# Patient Record
Sex: FEMALE | Race: WHITE | NOT HISPANIC OR LATINO | Employment: OTHER | URBAN - METROPOLITAN AREA
[De-identification: names, ages, dates, MRNs, and addresses within clinical notes are randomized per-mention and may not be internally consistent; named-entity substitution may affect disease eponyms.]

---

## 2019-12-19 PROBLEM — R40.4 ALTERED LEVEL OF CONSCIOUSNESS: Status: ACTIVE | Noted: 2019-12-19

## 2019-12-20 ENCOUNTER — HOSPITAL ENCOUNTER (OUTPATIENT)
Facility: MEDICAL CENTER | Age: 84
DRG: 026 | End: 2019-12-20
Admitting: HOSPITALIST
Payer: MEDICARE

## 2019-12-20 ENCOUNTER — HOSPITAL ENCOUNTER (INPATIENT)
Facility: MEDICAL CENTER | Age: 84
LOS: 8 days | DRG: 026 | End: 2019-12-28
Attending: HOSPITALIST | Admitting: HOSPITALIST
Payer: MEDICARE

## 2019-12-20 ENCOUNTER — HOSPITAL ENCOUNTER (OUTPATIENT)
Dept: RADIOLOGY | Facility: MEDICAL CENTER | Age: 84
End: 2019-12-20

## 2019-12-20 PROBLEM — I10 ESSENTIAL HYPERTENSION: Status: ACTIVE | Noted: 2019-12-20

## 2019-12-20 PROBLEM — R73.9 HYPERGLYCEMIA: Status: ACTIVE | Noted: 2019-12-20

## 2019-12-20 PROBLEM — G93.89 BRAIN MASS: Status: ACTIVE | Noted: 2019-12-20

## 2019-12-20 PROBLEM — G93.40 ACUTE ENCEPHALOPATHY: Status: ACTIVE | Noted: 2019-12-19

## 2019-12-20 PROCEDURE — 700105 HCHG RX REV CODE 258: Performed by: INTERNAL MEDICINE

## 2019-12-20 PROCEDURE — 94760 N-INVAS EAR/PLS OXIMETRY 1: CPT

## 2019-12-20 PROCEDURE — 770001 HCHG ROOM/CARE - MED/SURG/GYN PRIV*

## 2019-12-20 PROCEDURE — 99223 1ST HOSP IP/OBS HIGH 75: CPT | Performed by: INTERNAL MEDICINE

## 2019-12-20 PROCEDURE — 700111 HCHG RX REV CODE 636 W/ 250 OVERRIDE (IP): Performed by: INTERNAL MEDICINE

## 2019-12-20 RX ORDER — NALOXONE HYDROCHLORIDE 0.4 MG/ML
0.1 INJECTION, SOLUTION INTRAMUSCULAR; INTRAVENOUS; SUBCUTANEOUS
Status: DISCONTINUED | OUTPATIENT
Start: 2019-12-20 | End: 2019-12-28 | Stop reason: HOSPADM

## 2019-12-20 RX ORDER — BISACODYL 10 MG
10 SUPPOSITORY, RECTAL RECTAL
Status: DISCONTINUED | OUTPATIENT
Start: 2019-12-20 | End: 2019-12-26

## 2019-12-20 RX ORDER — PROMETHAZINE HYDROCHLORIDE 25 MG/ML
12.5 INJECTION, SOLUTION INTRAMUSCULAR; INTRAVENOUS EVERY 4 HOURS PRN
Status: DISCONTINUED | OUTPATIENT
Start: 2019-12-20 | End: 2019-12-28 | Stop reason: HOSPADM

## 2019-12-20 RX ORDER — ATORVASTATIN CALCIUM 40 MG/1
40 TABLET, FILM COATED ORAL EVERY EVENING
Status: DISCONTINUED | OUTPATIENT
Start: 2019-12-20 | End: 2019-12-28 | Stop reason: HOSPADM

## 2019-12-20 RX ORDER — DEXAMETHASONE SODIUM PHOSPHATE 4 MG/ML
4 INJECTION, SOLUTION INTRA-ARTICULAR; INTRALESIONAL; INTRAMUSCULAR; INTRAVENOUS; SOFT TISSUE EVERY 6 HOURS
Status: DISCONTINUED | OUTPATIENT
Start: 2019-12-20 | End: 2019-12-28 | Stop reason: HOSPADM

## 2019-12-20 RX ORDER — ONDANSETRON 2 MG/ML
4 INJECTION INTRAMUSCULAR; INTRAVENOUS EVERY 4 HOURS PRN
Status: DISCONTINUED | OUTPATIENT
Start: 2019-12-20 | End: 2019-12-28 | Stop reason: HOSPADM

## 2019-12-20 RX ORDER — SODIUM CHLORIDE 9 MG/ML
INJECTION, SOLUTION INTRAVENOUS CONTINUOUS
Status: DISCONTINUED | OUTPATIENT
Start: 2019-12-20 | End: 2019-12-28 | Stop reason: HOSPADM

## 2019-12-20 RX ORDER — DEXTROSE MONOHYDRATE 25 G/50ML
50 INJECTION, SOLUTION INTRAVENOUS PRN
Status: DISCONTINUED | OUTPATIENT
Start: 2019-12-20 | End: 2019-12-26

## 2019-12-20 RX ORDER — DOCUSATE SODIUM 100 MG/1
100 CAPSULE, LIQUID FILLED ORAL 2 TIMES DAILY PRN
Status: DISCONTINUED | OUTPATIENT
Start: 2019-12-20 | End: 2019-12-28 | Stop reason: HOSPADM

## 2019-12-20 RX ORDER — ACETAMINOPHEN 325 MG/1
650 TABLET ORAL EVERY 6 HOURS PRN
Status: DISCONTINUED | OUTPATIENT
Start: 2019-12-20 | End: 2019-12-28 | Stop reason: HOSPADM

## 2019-12-20 RX ORDER — LACTULOSE 20 G/30ML
30 SOLUTION ORAL
Status: DISCONTINUED | OUTPATIENT
Start: 2019-12-20 | End: 2019-12-28 | Stop reason: HOSPADM

## 2019-12-20 RX ORDER — ENALAPRILAT 1.25 MG/ML
1.25 INJECTION INTRAVENOUS EVERY 6 HOURS PRN
Status: DISCONTINUED | OUTPATIENT
Start: 2019-12-20 | End: 2019-12-28 | Stop reason: HOSPADM

## 2019-12-20 RX ORDER — ONDANSETRON 4 MG/1
4 TABLET, ORALLY DISINTEGRATING ORAL EVERY 4 HOURS PRN
Status: DISCONTINUED | OUTPATIENT
Start: 2019-12-20 | End: 2019-12-28 | Stop reason: HOSPADM

## 2019-12-20 RX ORDER — TEMAZEPAM 7.5 MG/1
7.5 CAPSULE ORAL
Status: DISCONTINUED | OUTPATIENT
Start: 2019-12-20 | End: 2019-12-28 | Stop reason: HOSPADM

## 2019-12-20 RX ORDER — OMEPRAZOLE 20 MG/1
20 CAPSULE, DELAYED RELEASE ORAL DAILY
Status: DISCONTINUED | OUTPATIENT
Start: 2019-12-21 | End: 2019-12-28 | Stop reason: HOSPADM

## 2019-12-20 RX ORDER — MAGNESIUM HYDROXIDE/ALUMINUM HYDROXICE/SIMETHICONE 120; 1200; 1200 MG/30ML; MG/30ML; MG/30ML
30 SUSPENSION ORAL EVERY 4 HOURS PRN
Status: DISCONTINUED | OUTPATIENT
Start: 2019-12-20 | End: 2019-12-20

## 2019-12-20 RX ORDER — HYDRALAZINE HYDROCHLORIDE 20 MG/ML
10 INJECTION INTRAMUSCULAR; INTRAVENOUS EVERY 4 HOURS PRN
Status: DISCONTINUED | OUTPATIENT
Start: 2019-12-20 | End: 2019-12-28 | Stop reason: HOSPADM

## 2019-12-20 RX ADMIN — DEXAMETHASONE SODIUM PHOSPHATE 4 MG: 4 INJECTION, SOLUTION INTRA-ARTICULAR; INTRALESIONAL; INTRAMUSCULAR; INTRAVENOUS; SOFT TISSUE at 21:30

## 2019-12-20 RX ADMIN — SODIUM CHLORIDE: 9 INJECTION, SOLUTION INTRAVENOUS at 21:30

## 2019-12-20 ASSESSMENT — COPD QUESTIONNAIRES
COPD SCREENING SCORE: 2
DO YOU EVER COUGH UP ANY MUCUS OR PHLEGM?: NO/ONLY WITH OCCASIONAL COLDS OR INFECTIONS
HAVE YOU SMOKED AT LEAST 100 CIGARETTES IN YOUR ENTIRE LIFE: NO/DON'T KNOW
DURING THE PAST 4 WEEKS HOW MUCH DID YOU FEEL SHORT OF BREATH: NONE/LITTLE OF THE TIME

## 2019-12-20 ASSESSMENT — LIFESTYLE VARIABLES: EVER_SMOKED: NEVER

## 2019-12-20 NOTE — PROGRESS NOTES
86yo presenting with ataxia and altered mentation.  New 2cm mass causing obstructive hydrocephalus.  Started on decadron.  Dr Fernandez of Neuro Surgery consulted and aware pt is coming.

## 2019-12-20 NOTE — PROGRESS NOTES
Transfer Center:    Received Med/Sx Inpatient to Direct Admit transfer request from [facility] [place] @ [phone]  Sending Physician:  Dr. Sergey Marinelli   Specialist consulted:  Dr. Mario Newton  Diagnosis:  New brain mass   Patient accepted by:  Dr. Brian Gardner     Patient coming via:  [facility]  ETA:  [phone]   Nursing to notify Direct Admit On-Call hospitalist when patient arrives     Plan:  Pending patient's arrival

## 2019-12-21 ENCOUNTER — APPOINTMENT (OUTPATIENT)
Dept: RADIOLOGY | Facility: MEDICAL CENTER | Age: 84
DRG: 026 | End: 2019-12-21
Attending: NEUROLOGICAL SURGERY
Payer: MEDICARE

## 2019-12-21 PROCEDURE — 700102 HCHG RX REV CODE 250 W/ 637 OVERRIDE(OP): Performed by: INTERNAL MEDICINE

## 2019-12-21 PROCEDURE — 74177 CT ABD & PELVIS W/CONTRAST: CPT

## 2019-12-21 PROCEDURE — A9270 NON-COVERED ITEM OR SERVICE: HCPCS | Performed by: INTERNAL MEDICINE

## 2019-12-21 PROCEDURE — 700117 HCHG RX CONTRAST REV CODE 255: Performed by: NEUROLOGICAL SURGERY

## 2019-12-21 PROCEDURE — 770001 HCHG ROOM/CARE - MED/SURG/GYN PRIV*

## 2019-12-21 PROCEDURE — 700105 HCHG RX REV CODE 258: Performed by: NEUROLOGICAL SURGERY

## 2019-12-21 PROCEDURE — 700105 HCHG RX REV CODE 258: Performed by: INTERNAL MEDICINE

## 2019-12-21 PROCEDURE — 700111 HCHG RX REV CODE 636 W/ 250 OVERRIDE (IP): Performed by: NEUROLOGICAL SURGERY

## 2019-12-21 PROCEDURE — 99232 SBSQ HOSP IP/OBS MODERATE 35: CPT | Performed by: INTERNAL MEDICINE

## 2019-12-21 PROCEDURE — 700111 HCHG RX REV CODE 636 W/ 250 OVERRIDE (IP): Performed by: INTERNAL MEDICINE

## 2019-12-21 RX ORDER — CEPHALEXIN 500 MG/1
500 CAPSULE ORAL EVERY 6 HOURS
Status: COMPLETED | OUTPATIENT
Start: 2019-12-21 | End: 2019-12-24

## 2019-12-21 RX ADMIN — DEXAMETHASONE SODIUM PHOSPHATE 4 MG: 4 INJECTION, SOLUTION INTRA-ARTICULAR; INTRALESIONAL; INTRAMUSCULAR; INTRAVENOUS; SOFT TISSUE at 16:26

## 2019-12-21 RX ADMIN — ATORVASTATIN CALCIUM 40 MG: 40 TABLET, FILM COATED ORAL at 18:23

## 2019-12-21 RX ADMIN — SODIUM CHLORIDE 500 MG: 9 INJECTION, SOLUTION INTRAVENOUS at 11:01

## 2019-12-21 RX ADMIN — SODIUM CHLORIDE: 9 INJECTION, SOLUTION INTRAVENOUS at 18:23

## 2019-12-21 RX ADMIN — DEXAMETHASONE SODIUM PHOSPHATE 4 MG: 4 INJECTION, SOLUTION INTRA-ARTICULAR; INTRALESIONAL; INTRAMUSCULAR; INTRAVENOUS; SOFT TISSUE at 04:58

## 2019-12-21 RX ADMIN — IOHEXOL 100 ML: 350 INJECTION, SOLUTION INTRAVENOUS at 12:38

## 2019-12-21 RX ADMIN — DEXAMETHASONE SODIUM PHOSPHATE 4 MG: 4 INJECTION, SOLUTION INTRA-ARTICULAR; INTRALESIONAL; INTRAMUSCULAR; INTRAVENOUS; SOFT TISSUE at 11:00

## 2019-12-21 RX ADMIN — DEXAMETHASONE SODIUM PHOSPHATE 4 MG: 4 INJECTION, SOLUTION INTRA-ARTICULAR; INTRALESIONAL; INTRAMUSCULAR; INTRAVENOUS; SOFT TISSUE at 20:11

## 2019-12-21 RX ADMIN — CEPHALEXIN 500 MG: 500 CAPSULE ORAL at 16:26

## 2019-12-21 RX ADMIN — SODIUM CHLORIDE: 9 INJECTION, SOLUTION INTRAVENOUS at 07:27

## 2019-12-21 RX ADMIN — CEPHALEXIN 500 MG: 500 CAPSULE ORAL at 20:11

## 2019-12-21 ASSESSMENT — ENCOUNTER SYMPTOMS
GASTROINTESTINAL NEGATIVE: 1
RESPIRATORY NEGATIVE: 1
CONSTITUTIONAL NEGATIVE: 1
CARDIOVASCULAR NEGATIVE: 1
SPEECH CHANGE: 1
DIZZINESS: 1
TREMORS: 1
PSYCHIATRIC NEGATIVE: 1

## 2019-12-21 ASSESSMENT — LIFESTYLE VARIABLES
ALCOHOL_USE: NO
ON A TYPICAL DAY WHEN YOU DRINK ALCOHOL HOW MANY DRINKS DO YOU HAVE: 0
EVER_SMOKED: NEVER
EVER HAD A DRINK FIRST THING IN THE MORNING TO STEADY YOUR NERVES TO GET RID OF A HANGOVER: NO
CONSUMPTION TOTAL: NEGATIVE
TOTAL SCORE: 0
HAVE YOU EVER FELT YOU SHOULD CUT DOWN ON YOUR DRINKING: NO
HOW MANY TIMES IN THE PAST YEAR HAVE YOU HAD 5 OR MORE DRINKS IN A DAY: 0
AVERAGE NUMBER OF DAYS PER WEEK YOU HAVE A DRINK CONTAINING ALCOHOL: 0
TOTAL SCORE: 0
TOTAL SCORE: 0
HAVE PEOPLE ANNOYED YOU BY CRITICIZING YOUR DRINKING: NO
EVER FELT BAD OR GUILTY ABOUT YOUR DRINKING: NO

## 2019-12-21 ASSESSMENT — COGNITIVE AND FUNCTIONAL STATUS - GENERAL
MOBILITY SCORE: 24
SUGGESTED CMS G CODE MODIFIER MOBILITY: CH
SUGGESTED CMS G CODE MODIFIER DAILY ACTIVITY: CH
DAILY ACTIVITIY SCORE: 24

## 2019-12-21 ASSESSMENT — PATIENT HEALTH QUESTIONNAIRE - PHQ9
1. LITTLE INTEREST OR PLEASURE IN DOING THINGS: NOT AT ALL
2. FEELING DOWN, DEPRESSED, IRRITABLE, OR HOPELESS: NOT AT ALL
SUM OF ALL RESPONSES TO PHQ9 QUESTIONS 1 AND 2: 0

## 2019-12-21 NOTE — CONSULTS
Neurosurgery Consult Note    Patient: Vera Garza    MRN: [id number]    Date of Consultation: 12/21/2019    Reason for Consultation: Left-sided brain mass    Referring Physician: Emergency department    History of Present Illness:  85-year-old female presented outside facility with altered mental status on 12/20/2019.  Patient cannot recollect why she went to the hospital.  Per chart review patient was accompanied by her daughter to the emergency room.  Patient does state that she lives alone usually.  She does have a daughter that helps her.  At this time the patient denies any headaches no weakness no numbness no tingling in any of her extremities.  No change in vision no double vision no blurry vision.  Patient was transferred to [facility] due to evidence of a left-sided temporal region mass.    Review of Systems:  Constitutional: Denies fevers, chills, night sweats, or weight changes  Eyes: Denies changes in vision, or eye pain  Ears/Nose/Throat/Mouth: Denies nasal congestion or sore throat   Cardiovascular: Denies chest pain or palpitations   Respiratory: Denies shortness of breath, or cough  Gastrointestinal/Hepatic: Denies abdominal pain, nausea, vomiting, diarrhea, or constipation  Genitourinary: Denies dysuria, frequency, or incontinence  Musculoskeletal/Rheum: Denies joint pain or swelling   Skin: Denies rash  Neurological: Denies headache, does have some confusion, memory loss, no focal weakness, or parasthesias  Psychiatric: Denies mood disorder   Endocrine: Denies hx of diabetes or thyroid dysfunction  Heme/Oncology/Lymph Nodes: Denies enlarged lymph nodes, bruising, or known bleeding disorder  Allergic/Immunologic: Denies hx of autoimmune disorder      Medications:  Current Facility-Administered Medications   Medication Dose Route Frequency Provider Last Rate Last Dose   • omeprazole (PRILOSEC) capsule 20 mg  20 mg Oral DAILY Fracisco Ferrer M.D.   Stopped at 12/21/19 0600   • atorvastatin (LIPITOR)  tablet 40 mg  40 mg Oral Q EVENING Fracisco Ferrer M.D.   Stopped at 12/20/19 2130   • dexamethasone (DECADRON) injection 4 mg  4 mg Intravenous Q6HRS Fracisco Ferrer M.D.   4 mg at 12/21/19 0458   • NS infusion   Intravenous Continuous Fracisco Ferrer M.D. 100 mL/hr at 12/20/19 2130     • acetaminophen (TYLENOL) tablet 650 mg  650 mg Oral Q6HRS PRN Fracisco Ferrer M.D.       • bisacodyl (DULCOLAX) suppository 10 mg  10 mg Rectal Q24HRS PRN Fracisco Ferrer M.D.       • dextrose 50% (D50W) injection 50 mL  50 mL Intravenous PRN Fracisco Ferrer M.D.       • docusate sodium (COLACE) capsule 100 mg  100 mg Oral BID PRN Fracisco Ferrer M.D.       • enalaprilat (VASOTEC) injection 1.25 mg  1.25 mg Intravenous Q6HRS PRN Fracisco Ferrer M.D.       • hydrALAZINE (APRESOLINE) injection 10 mg  10 mg Intravenous Q4HRS PRN Fracisco Ferrer M.D.       • lactulose 20 GM/30ML solution 30 g  30 g Oral Q24HRS PRN Fracisco Ferrer M.D.       • magnesium hydroxide (MILK OF MAGNESIA) suspension 30 mL  30 mL Oral QDAY PRN Fracisco Ferrer M.D.       • naloxone (NARCAN) injection 0.1 mg  0.1 mg Intravenous Q5 MIN PRN Fracisco Ferrer M.D.       • ondansetron (ZOFRAN ODT) dispertab 4 mg  4 mg Oral Q4HRS PRN Fracisco Ferrer M.D.       • ondansetron (ZOFRAN) syringe/vial injection 4 mg  4 mg Intravenous Q4HRS PRN Fracisco Ferrer M.D.       • promethazine (PHENERGAN) injection 12.5 mg  12.5 mg Intramuscular Q4HRS PRN Fracisco Ferrer M.D.       • Respiratory Care per Protocol   Nebulization Continuous RT Fracisco Ferrer M.D.       • temazepam (RESTORIL) 7.5 mg  7.5 mg Oral QHS PRN Fracisco Ferrer M.D.           Allergies:  Allergies   Allergen Reactions   • Pcn [Penicillins]        Past Medical History:  Past Medical History:   Diagnosis Date   • Colitis    • Hypertension        Past Surgical History:  No past surgical history on file.    Family History:  No family history on file.    Social History:  Social History     Socioeconomic History   • Marital status:      Spouse name: Not on file   • Number of  children: Not on file   • Years of education: Not on file   • Highest education level: Not on file   Occupational History   • Not on file   Social Needs   • Financial resource strain: Not on file   • Food insecurity:     Worry: Not on file     Inability: Not on file   • Transportation needs:     Medical: Not on file     Non-medical: Not on file   Tobacco Use   • Smoking status: Unknown If Ever Smoked   Substance and Sexual Activity   • Alcohol use: No   • Drug use: No   • Sexual activity: Not on file   Lifestyle   • Physical activity:     Days per week: Not on file     Minutes per session: Not on file   • Stress: Not on file   Relationships   • Social connections:     Talks on phone: Not on file     Gets together: Not on file     Attends Hinduism service: Not on file     Active member of club or organization: Not on file     Attends meetings of clubs or organizations: Not on file     Relationship status: Not on file   • Intimate partner violence:     Fear of current or ex partner: Not on file     Emotionally abused: Not on file     Physically abused: Not on file     Forced sexual activity: Not on file   Other Topics Concern   • Not on file   Social History Narrative   • Not on file       Physical Examination:  Awake conversant, appropriate and answers, AAO x2 not to place  Follows commands in all 4 extremities  Antigravity strength in all 4 extremities  Sensory light touch is intact in all 4 extremities  PERRLA BL, EOMI    Labs:  Recent Labs     12/19/19  1840 12/20/19  0645   WBC 7.9 10.6   RBC 4.37 3.97*   HEMOGLOBIN 14.1 12.6*   HEMATOCRIT 41.4 36.9*   MCV 94.7 92.9   MCH 32.3* 31.7*   MCHC 34.1 34.1   RDW 11.9 11.7   PLATELETCT 274 230   MPV 8.6 8.3     Recent Labs     12/19/19  1840 12/20/19  0645   SODIUM 138 136   POTASSIUM 3.7 3.6   CHLORIDE 101 102   CO2 25 21   GLUCOSE 199* 124*   BUN 16 11   CREATININE 0.9 0.6   CALCIUM 9.1 8.5               Imaging: MRI of the brain with and without contrast  A  heterogeneously enhancing mass measuring 17 x 14 x 19 mm is noted situated within the choroid plexus of the left lateral ventricle, within the collateral trigone. There is a mild amount of mass effect upon adjacent brain structures, but no evidence of   current ventricular obstruction.  There is enlargement of the coronoid plexus within the temporal horn, possibly related to congestion.     Mild White matter degenerative changes are noted involving the cerebral hemispheres.    Assessment and Plan:  Left-sided temporal region mass    A lot absolutely convinced that the mass is coming from cord plexus although that would be in the differential diagnosis.  At this time work-up must be performed to rule out metastatic disease.  Chest abdomen pelvis CT scan is pending.  Further recommendations will follow post CT scan.  If there is a lesion that looks cancerous in nature on the chest and pelvis I would recommend biopsying that lesion and treating the brain lesion as a metastatic manifestation.  If the chest abdomen pelvis is negative then we will consider biopsying the lesion for further assessment and evaluation from pathology.  I do not believe the mass is completely resectable as it is in the deep portion of the left-sided temporal lobe.  Considering patient's age and comorbidities aggressive resection may not be the best option.  Further recommendations are to follow post imaging studies.  No emergent neurosurgical intervention at this time.  Continue Decadron and I will add Keppra 500 mg twice daily.  Patient may have originally had a seizure since the mass is in a seizure prone area namely the left temporal lobe.      Mario Newton D.O.

## 2019-12-21 NOTE — ASSESSMENT & PLAN NOTE
A heterogeneously enhancing mass measuring 17 x 14 x 19 mm is noted situated within the choroid plexus of the left lateral ventricle, within the collateral trigone. There is a mild amount of mass effect upon adjacent brain structures, but no evidence of   current ventricular obstruction.  There is enlargement of the coronoid plexus within the temporal horn, possibly related to congestion.    Pt now on decadron, keppra  NS planning biopsy   CT body negative  MRI pending today

## 2019-12-21 NOTE — H&P
Hospital Medicine History & Physical Note    Date of Service  12/20/2019    Primary Care Physician  Philip Hansen D.O.    Consultants  Dr. Fernandez    Code Status  full    Chief Complaint  AMS    History of Presenting Illness  85 y.o. female with past medical history of essential hypertension who presented 12/20/2019 with altered mental status.  She initially presented to outlying facility because surgery.  According to the patient she has been confused for the past 2 days.  The patient is accompanied by the daughter by the bedside.  The patient lives alone at home.  Currently according to the daughter she is still a little bit confused.  She stated that she has been having declining in her memory over the past few months.  The patient denies any headache, any weakness numbness over her extremities.  But she does mention that she noticed some slurred speech 2 days ago.  At outside facility she has   CT scan of the head done and found to have  1.5 cm mass lesion associated with the left choroidal plexus in the atria of the left lateral ventricle. This is of high attenuation. Differential diagnostic possibilities need to include a choroidal plexus papilloma as well as intra-ventricular meningioma.  MRI 17 x 14 x 19 mm is noted situated within the choroid plexus of the left lateral ventricle, within the collateral trigone. There is a mild amount of mass effect upon adjacent brain structures, but no evidence of   current ventricular obstruction.   NS was consulted before transfer.  Review of Systems  Review of Systems   Unable to perform ROS: Mental acuity       Past Medical History   has a past medical history of Colitis and Hypertension. She also has no past medical history of Diabetes.    Surgical History  Reviewed and no pertinent PSH     Family History   Reviewed and no pertinent     Social History   reports that she does not drink alcohol or use drugs.    Allergies  Allergies   Allergen Reactions   • Pcn  [Penicillins]        Medications  Prior to Admission Medications   Prescriptions Last Dose Informant Patient Reported? Taking?   Nebivolol HCl (BYSTOLIC) 5 MG TABS   Yes No   Sig: Take 5 mg by mouth.   cephALEXin (KEFLEX) 500 MG Cap   Yes No   Sig: Take 500 mg by mouth 3 times a day. Indications: Infection of the Skin and/or Skin Structures   omeprazole (PRILOSEC) 20 MG CPDR   Yes No   Sig: Take 20 mg by mouth every day.      Facility-Administered Medications: None       Physical Exam  Temp:  [37.2 °C (98.9 °F)] 37.2 °C (98.9 °F)  Pulse:  [87] 87  Resp:  [16] 16  BP: (149)/(69) 149/69  SpO2:  [91 %] 91 %    Physical Exam  Vitals signs reviewed.   Constitutional:       General: She is not in acute distress.     Appearance: Normal appearance.      Comments: confused   HENT:      Head: Normocephalic and atraumatic.      Nose: No congestion or rhinorrhea.   Eyes:      Extraocular Movements: Extraocular movements intact.      Pupils: Pupils are equal, round, and reactive to light.   Neck:      Musculoskeletal: Normal range of motion and neck supple.   Cardiovascular:      Rate and Rhythm: Normal rate and regular rhythm.      Pulses: Normal pulses.   Pulmonary:      Effort: Pulmonary effort is normal. No respiratory distress.      Breath sounds: Normal breath sounds.   Abdominal:      General: Bowel sounds are normal. There is no distension.      Palpations: Abdomen is soft.      Tenderness: There is no tenderness.   Musculoskeletal:         General: No swelling or tenderness.   Skin:     General: Skin is warm.      Findings: No erythema.   Neurological:      General: No focal deficit present.      Mental Status: She is alert.         Laboratory:  Recent Labs     12/19/19 1840 12/20/19  0645   WBC 7.9 10.6   RBC 4.37 3.97*   HEMOGLOBIN 14.1 12.6*   HEMATOCRIT 41.4 36.9*   MCV 94.7 92.9   MCH 32.3* 31.7*   MCHC 34.1 34.1   RDW 11.9 11.7   PLATELETCT 274 230   MPV 8.6 8.3     Recent Labs     12/19/19 1840 12/20/19  0645    SODIUM 138 136   POTASSIUM 3.7 3.6   CHLORIDE 101 102   CO2 25 21   GLUCOSE 199* 124*   BUN 16 11   CREATININE 0.9 0.6   CALCIUM 9.1 8.5     Recent Labs     12/19/19  1840 12/20/19  0645   ALTSGPT 28  --    ASTSGOT 22  --    ALKPHOSPHAT 88  --    TBILIRUBIN 0.4  --    LIPASE 77  --    GLUCOSE 199* 124*         No results for input(s): NTPROBNP in the last 72 hours.  Recent Labs     12/20/19  0645   TRIGLYCERIDE 44   HDL 88.0*   *     No results for input(s): TROPONINT in the last 72 hours.    Urinalysis:    Recent Labs     12/19/19  1900   SPECGRAVITY >=1.030*   GLUCOSEUR NEGATIVE   KETONES TRACE*   NITRITE NEGATIVE   LEUKESTERAS NEGATIVE   WBCURINE Rare   RBCURINE 0-2   BACTERIA Rare*   EPITHELCELL Rare*        Imaging:  CT-CHEST,ABDOMEN,PELVIS WITH    (Results Pending)         Assessment/Plan:  I anticipate this patient will require at least two midnights for appropriate medical management, necessitating inpatient admission.    Hyperglycemia- (present on admission)  Assessment & Plan  No history of DM    Essential hypertension- (present on admission)  Assessment & Plan  Holding bysystolic    Brain mass- (present on admission)  Assessment & Plan  CT head showed;  1.5 cm mass lesion associated with the left choroidal plexus in the atria of the left lateral ventricle. This is of high attenuation. Differential diagnostic possibilities need to include a choroidal plexus papilloma as well as intra-ventricular meningioma.   MRI brain 17 x 14 x 19 mm is noted situated within the choroid plexus of the left lateral ventricle, within the collateral trigone. There is a mild amount of mass effect upon adjacent brain structures, but no evidence of   current ventricular obstruction.  Dr. Newton consulted  CT chest/abd/pelvic ordered by NS  Monitor for possible seizure or new neurologic deficits  Started on decadron IV  NPO at midnight    Acute encephalopathy- (present on admission)  Assessment & Plan  Likely related  to brain mass  Avoid narcotics        VTE prophylaxis: scds

## 2019-12-21 NOTE — ASSESSMENT & PLAN NOTE
12/28 - discharged on 10mg lisinopril.  Intermittently well controlled on this and intermittently not controlled.  Large swings give concern for repeatability of the measure of her BP vs. Physiologic disturbances from the brain mass.  12/26-12/27 - stable  12/25 - continues to be poorly controlled, patient states not on any blood pressure medication prior to hospitalization.  Starting lisinopril 10mg today.  12/24 - poorly controlled in the mid 140s up to 160s while inpatient.  Permissive hypertension until after biopsy.   12/23 - stable  - Previous Plans by Other Providers -  Holding bystolic   98.4

## 2019-12-21 NOTE — ASSESSMENT & PLAN NOTE
12/28 - Patient medically stable for discharge.  Discharging on 4mg decadron q6h until cleared by NS and keppra 500mg bid  12/27 - Biopsy completed, patient will have follow-up in the OP in two weeks for results, likely discharge tomorrow.  Mentation actually improved quite a bit, much less visible perplexed look and more appropriate conversation.  12/26 - Patient seems to have flatlined in her improvement.  No changes at all now.  Biopsy today.  12/25 - patient ambulating halls, daughter communicates improvement in cognition.  Biopsy scheduled for tomorrow @1300  12/24 - Continued improvement today.  Awaiting biopsy, continue steroids.  12/23 - Biopsy has been rescheduled to Thursday.  Patient and patient's family note remarkable improvement since she was admitted.  Continue Steroids.  - Previous Plans by Other Providers -  CT head showed;  1.5 cm mass lesion associated with the left choroidal plexus in the atria of the left lateral ventricle. This is of high attenuation. Differential diagnostic possibilities need to include a choroidal plexus papilloma as well as intra-ventricular meningioma.   MRI brain 17 x 14 x 19 mm is noted situated within the choroid plexus of the left lateral ventricle, within the collateral trigone. There is a mild amount of mass effect upon adjacent brain structures, but no evidence of   current ventricular obstruction.  Dr. Newton consulted  CT chest/abd/pelvic ordered by NS  Monitor for possible seizure or new neurologic deficits  Started on decadron IV  NPO at midnight

## 2019-12-21 NOTE — ASSESSMENT & PLAN NOTE
12/28 - Seems improved, likely near baseline.  Signficant family support for patient.  12/26-12/27 - stable  12/23-12/25 - Much improved.  - Previous Plans by Other Providers -  Likely related to brain mass  Avoid narcotics

## 2019-12-21 NOTE — PROGRESS NOTES
Internal Medicine Daily Progress Note    Date of Service  12/21/2019    Chief Complaint  85 y.o. female admitted 12/20/2019 with mental status changes.    Hospital Course   Pt with past medical history of essential hypertension who presented 12/20/2019 with altered mental status.  According to the patient she has been confused for the past 2 days.  The patient was accompanied by the daughter by the bedside.  The patient lives alone at home.  Currently according to the daughter she is still a little bit confused.  She stated that she has been having declining in her memory over the past few months.  The patient denies any headache, any weakness numbness over her extremities.  But she does mention that she noticed some slurred speech 2 days ago.  At outside facility she has   CT scan of the head done and found to have  1.5 cm mass lesion associated with the left choroidal plexus in the atria of the left lateral ventricle. This is of high attenuation. Differential diagnostic possibilities need to include a choroidal plexus papilloma as well as intra-ventricular meningioma.  MRI 17 x 14 x 19 mm is noted situated within the choroid plexus of the left lateral ventricle, within the collateral trigone. There is a mild amount of mass effect upon adjacent brain structures, but no evidence of   current ventricular obstruction.      Interval Problem Update  Neurosurg eval today, feels mass from cord plexus   Likely biopsy pending  May not be amenable to resection given pt age  Started Decadron and Keppra    Consultants/Specialty  NS    Code Status  Full      Review of Systems  Review of Systems   Constitutional: Negative.    Respiratory: Negative.    Cardiovascular: Negative.    Gastrointestinal: Negative.    Genitourinary: Negative.    Musculoskeletal:        Resolving thumb wound with mostly scab now   Neurological: Positive for dizziness, tremors and speech change.   Psychiatric/Behavioral: Negative.         Physical  Exam  Temp:  [36.7 °C (98.1 °F)-37.6 °C (99.6 °F)] 37.6 °C (99.6 °F)  Pulse:  [75-87] 77  Resp:  [16-17] 16  BP: (132-156)/(66-77) 156/77  SpO2:  [91 %-96 %] 96 %    Physical Exam  Vitals signs reviewed. Exam conducted with a chaperone present.   Constitutional:       Appearance: She is normal weight.   HENT:      Head: Normocephalic and atraumatic.   Cardiovascular:      Rate and Rhythm: Normal rate and regular rhythm.      Pulses: Normal pulses.      Heart sounds: Normal heart sounds.   Pulmonary:      Effort: Pulmonary effort is normal.   Abdominal:      General: Abdomen is flat.      Palpations: Abdomen is soft.   Musculoskeletal: Normal range of motion.   Skin:     General: Skin is warm.      Findings: Lesion (very small scab, healing R thumb) present.   Neurological:      Mental Status: She is alert. She is disoriented.         Fluids  No intake or output data in the 24 hours ending 12/21/19 1428    Laboratory  Recent Labs     12/19/19  1840 12/20/19  0645   WBC 7.9 10.6   RBC 4.37 3.97*   HEMOGLOBIN 14.1 12.6*   HEMATOCRIT 41.4 36.9*   MCV 94.7 92.9   MCH 32.3* 31.7*   MCHC 34.1 34.1   RDW 11.9 11.7   PLATELETCT 274 230   MPV 8.6 8.3     Recent Labs     12/19/19  1840 12/20/19  0645   SODIUM 138 136   POTASSIUM 3.7 3.6   CHLORIDE 101 102   CO2 25 21   GLUCOSE 199* 124*   BUN 16 11   CREATININE 0.9 0.6   CALCIUM 9.1 8.5             Recent Labs     12/20/19  0645   TRIGLYCERIDE 44   HDL 88.0*   *       Imaging  CT-CHEST,ABDOMEN,PELVIS WITH   Final Result         1. No CT evidence of malignancy or metastatic disease in the chest, abdomen or pelvis.      2. Cholelithiasis.           Assessment/Plan  Essential hypertension- (present on admission)  Assessment & Plan  Currently controlled    Brain mass- (present on admission)  Assessment & Plan  A heterogeneously enhancing mass measuring 17 x 14 x 19 mm is noted situated within the choroid plexus of the left lateral ventricle, within the collateral trigone.  There is a mild amount of mass effect upon adjacent brain structures, but no evidence of   current ventricular obstruction.  There is enlargement of the coronoid plexus within the temporal horn, possibly related to congestion.    Pt now on decadron, keppra  NS planning biopsy   CT body negative    Acute encephalopathy- (present on admission)  Assessment & Plan  Secondary to brain mass  Improved but still confused       VTE prophylaxis: none, brain mass

## 2019-12-21 NOTE — PROGRESS NOTES
Pt arrived to floor [facility], pt oriented to room and call light system, pt A&O to self, Daughter Lin is staying the night with her.   Bed locked and in lowest position, bed alarm on. Hourly rounding in place.   Paged hospitalist Dr. Ferrer, and paged Dr. Newton.

## 2019-12-22 ENCOUNTER — APPOINTMENT (OUTPATIENT)
Dept: RADIOLOGY | Facility: MEDICAL CENTER | Age: 84
DRG: 026 | End: 2019-12-22
Attending: NEUROLOGICAL SURGERY
Payer: MEDICARE

## 2019-12-22 LAB
ANION GAP SERPL CALC-SCNC: 8 MMOL/L (ref 0–11.9)
BUN SERPL-MCNC: 16 MG/DL (ref 8–22)
CALCIUM SERPL-MCNC: 8.5 MG/DL (ref 8.5–10.5)
CHLORIDE SERPL-SCNC: 108 MMOL/L (ref 96–112)
CO2 SERPL-SCNC: 24 MMOL/L (ref 20–33)
CREAT SERPL-MCNC: 0.79 MG/DL (ref 0.5–1.4)
EKG IMPRESSION: NORMAL
GLUCOSE SERPL-MCNC: 125 MG/DL (ref 65–99)
INR PPP: 1.04 (ref 0.87–1.13)
POTASSIUM SERPL-SCNC: 3.7 MMOL/L (ref 3.6–5.5)
PROTHROMBIN TIME: 13.8 SEC (ref 12–14.6)
SODIUM SERPL-SCNC: 140 MMOL/L (ref 135–145)

## 2019-12-22 PROCEDURE — 700105 HCHG RX REV CODE 258: Performed by: INTERNAL MEDICINE

## 2019-12-22 PROCEDURE — 700105 HCHG RX REV CODE 258: Performed by: NEUROLOGICAL SURGERY

## 2019-12-22 PROCEDURE — A9270 NON-COVERED ITEM OR SERVICE: HCPCS | Performed by: INTERNAL MEDICINE

## 2019-12-22 PROCEDURE — 80048 BASIC METABOLIC PNL TOTAL CA: CPT

## 2019-12-22 PROCEDURE — 93005 ELECTROCARDIOGRAM TRACING: CPT | Performed by: INTERNAL MEDICINE

## 2019-12-22 PROCEDURE — A9576 INJ PROHANCE MULTIPACK: HCPCS | Performed by: NEUROLOGICAL SURGERY

## 2019-12-22 PROCEDURE — 700102 HCHG RX REV CODE 250 W/ 637 OVERRIDE(OP): Performed by: INTERNAL MEDICINE

## 2019-12-22 PROCEDURE — 770001 HCHG ROOM/CARE - MED/SURG/GYN PRIV*

## 2019-12-22 PROCEDURE — 93010 ELECTROCARDIOGRAM REPORT: CPT | Mod: GZ | Performed by: INTERNAL MEDICINE

## 2019-12-22 PROCEDURE — 99232 SBSQ HOSP IP/OBS MODERATE 35: CPT | Performed by: INTERNAL MEDICINE

## 2019-12-22 PROCEDURE — 85610 PROTHROMBIN TIME: CPT

## 2019-12-22 PROCEDURE — 700117 HCHG RX CONTRAST REV CODE 255: Performed by: NEUROLOGICAL SURGERY

## 2019-12-22 PROCEDURE — 700111 HCHG RX REV CODE 636 W/ 250 OVERRIDE (IP): Performed by: INTERNAL MEDICINE

## 2019-12-22 PROCEDURE — 700111 HCHG RX REV CODE 636 W/ 250 OVERRIDE (IP): Performed by: NEUROLOGICAL SURGERY

## 2019-12-22 PROCEDURE — 70553 MRI BRAIN STEM W/O & W/DYE: CPT

## 2019-12-22 PROCEDURE — 36415 COLL VENOUS BLD VENIPUNCTURE: CPT

## 2019-12-22 RX ADMIN — SODIUM CHLORIDE 500 MG: 9 INJECTION, SOLUTION INTRAVENOUS at 18:21

## 2019-12-22 RX ADMIN — ATORVASTATIN CALCIUM 40 MG: 40 TABLET, FILM COATED ORAL at 18:21

## 2019-12-22 RX ADMIN — DEXAMETHASONE SODIUM PHOSPHATE 4 MG: 4 INJECTION, SOLUTION INTRA-ARTICULAR; INTRALESIONAL; INTRAMUSCULAR; INTRAVENOUS; SOFT TISSUE at 03:35

## 2019-12-22 RX ADMIN — CEPHALEXIN 500 MG: 500 CAPSULE ORAL at 20:08

## 2019-12-22 RX ADMIN — DEXAMETHASONE SODIUM PHOSPHATE 4 MG: 4 INJECTION, SOLUTION INTRA-ARTICULAR; INTRALESIONAL; INTRAMUSCULAR; INTRAVENOUS; SOFT TISSUE at 14:17

## 2019-12-22 RX ADMIN — SODIUM CHLORIDE 500 MG: 9 INJECTION, SOLUTION INTRAVENOUS at 05:16

## 2019-12-22 RX ADMIN — SODIUM CHLORIDE: 9 INJECTION, SOLUTION INTRAVENOUS at 14:25

## 2019-12-22 RX ADMIN — DEXAMETHASONE SODIUM PHOSPHATE 4 MG: 4 INJECTION, SOLUTION INTRA-ARTICULAR; INTRALESIONAL; INTRAMUSCULAR; INTRAVENOUS; SOFT TISSUE at 08:20

## 2019-12-22 RX ADMIN — OMEPRAZOLE 20 MG: 20 CAPSULE, DELAYED RELEASE ORAL at 03:36

## 2019-12-22 RX ADMIN — DEXAMETHASONE SODIUM PHOSPHATE 4 MG: 4 INJECTION, SOLUTION INTRA-ARTICULAR; INTRALESIONAL; INTRAMUSCULAR; INTRAVENOUS; SOFT TISSUE at 20:08

## 2019-12-22 RX ADMIN — CEPHALEXIN 500 MG: 500 CAPSULE ORAL at 08:20

## 2019-12-22 RX ADMIN — GADOTERIDOL 14 ML: 279.3 INJECTION, SOLUTION INTRAVENOUS at 19:09

## 2019-12-22 RX ADMIN — CEPHALEXIN 500 MG: 500 CAPSULE ORAL at 03:36

## 2019-12-22 RX ADMIN — CEPHALEXIN 500 MG: 500 CAPSULE ORAL at 14:17

## 2019-12-22 RX ADMIN — SODIUM CHLORIDE: 9 INJECTION, SOLUTION INTRAVENOUS at 03:35

## 2019-12-22 ASSESSMENT — ENCOUNTER SYMPTOMS
NEUROLOGICAL NEGATIVE: 1
GASTROINTESTINAL NEGATIVE: 1
PSYCHIATRIC NEGATIVE: 1
MUSCULOSKELETAL NEGATIVE: 1
RESPIRATORY NEGATIVE: 1
CONSTITUTIONAL NEGATIVE: 1

## 2019-12-22 NOTE — PROGRESS NOTES
Internal Medicine Daily Progress Note    Date of Service  12/22/2019    Chief Complaint  85 y.o. female admitted 12/20/2019 with mental status changes    Hospital Course   Pt with past medical history of essential hypertension who presented 12/20/2019 with altered mental status.  According to the patient she has been confused for the past 2 days.  The patient was accompanied by the daughter by the bedside.  The patient lives alone at home.  Currently according to the daughter she is still a little bit confused.  She stated that she has been having declining in her memory over the past few months.  The patient denies any headache, any weakness numbness over her extremities.  But she does mention that she noticed some slurred speech 2 days ago.  At outside facility she has   CT scan of the head done and found to have  1.5 cm mass lesion associated with the left choroidal plexus in the atria of the left lateral ventricle. This is of high attenuation. Differential diagnostic possibilities need to include a choroidal plexus papilloma as well as intra-ventricular meningioma.  MRI 17 x 14 x 19 mm is noted situated within the choroid plexus of the left lateral ventricle, within the collateral trigone. There is a mild amount of mass effect upon adjacent brain structures, but no evidence of   current ventricular obstruction.      Daughter at bedside again today.      Interval Problem Update  Less confusion today.  Continues on Decadron.  Repeat MRI per NS today, followed by biopsy this week.  Pt will be medically cleared for surgical procedure today.  Will check EKG, update labs.  If those are unremarkable, pt is clear for surgery.    Consultants/Specialty  Neurosurgery    Code Status  Full    Review of Systems  Review of Systems   Constitutional: Negative.    HENT: Negative.    Respiratory: Negative.    Gastrointestinal: Negative.    Genitourinary: Negative.    Musculoskeletal: Negative.    Neurological: Negative.     Psychiatric/Behavioral: Negative.         Physical Exam  Temp:  [36.7 °C (98 °F)-37.6 °C (99.6 °F)] 37.1 °C (98.7 °F)  Pulse:  [77-93] 93  Resp:  [] 16  BP: (135-156)/(55-81) 135/66  SpO2:  [93 %-96 %] 95 %    Physical Exam  Constitutional:       Appearance: Normal appearance.   Cardiovascular:      Rate and Rhythm: Normal rate and regular rhythm.      Pulses: Normal pulses.      Heart sounds: Normal heart sounds.   Pulmonary:      Effort: Pulmonary effort is normal.      Breath sounds: Normal breath sounds.   Abdominal:      General: Abdomen is flat. Bowel sounds are normal.      Palpations: Abdomen is soft.   Skin:     General: Skin is warm and dry.   Neurological:      Mental Status: She is alert and oriented to person, place, and time.   Psychiatric:         Mood and Affect: Mood normal.         Behavior: Behavior normal.         Thought Content: Thought content normal.         Judgment: Judgment normal.         Fluids  No intake or output data in the 24 hours ending 12/22/19 1035    Laboratory  Recent Labs     12/19/19  1840 12/20/19  0645   WBC 7.9 10.6   RBC 4.37 3.97*   HEMOGLOBIN 14.1 12.6*   HEMATOCRIT 41.4 36.9*   MCV 94.7 92.9   MCH 32.3* 31.7*   MCHC 34.1 34.1   RDW 11.9 11.7   PLATELETCT 274 230   MPV 8.6 8.3     Recent Labs     12/19/19  1840 12/20/19  0645   SODIUM 138 136   POTASSIUM 3.7 3.6   CHLORIDE 101 102   CO2 25 21   GLUCOSE 199* 124*   BUN 16 11   CREATININE 0.9 0.6   CALCIUM 9.1 8.5             Recent Labs     12/20/19  0645   TRIGLYCERIDE 44   HDL 88.0*   *       Imaging  CT-CHEST,ABDOMEN,PELVIS WITH   Final Result         1. No CT evidence of malignancy or metastatic disease in the chest, abdomen or pelvis.      2. Cholelithiasis.      MR-BRAIN-WITH & W/O    (Results Pending)        Assessment/Plan  Brain mass- (present on admission)  Assessment & Plan  A heterogeneously enhancing mass measuring 17 x 14 x 19 mm is noted situated within the choroid plexus of the left lateral  ventricle, within the collateral trigone. There is a mild amount of mass effect upon adjacent brain structures, but no evidence of   current ventricular obstruction.  There is enlargement of the coronoid plexus within the temporal horn, possibly related to congestion.    Pt now on decadron, keppra  NS planning biopsy   CT body negative  MRI pending today    Essential hypertension- (present on admission)  Assessment & Plan  Currently controlled    Acute encephalopathy- (present on admission)  Assessment & Plan  Secondary to brain mass  Improved but still confused       VTE prophylaxis: None

## 2019-12-22 NOTE — PROGRESS NOTES
Patient seen and examined, NAD.  No change in neuro status and no change in exam.  Patient states that she does feel better today.  Most likely related to Decadron with decrease in cerebral edema.  Patients daughter at bedside.  Notified that the CT of the chest abdomen pelvis was negative for any masses.  Therefore at this time a biopsy of the lesion is the most prudent thing to do.  A repeat MRI with and without contrast with stealth protocol will be performed in preparation for the operation.  The patient and the daughter agree with the plan.  Further recommendations are to follow post biopsy depending on pathology of the lesion.  At this time I would appreciate preoperative clearance from the medical team.  Please call with any questions.  Okay for DVT prophylaxis with subcu heparin 5000 units every 8 hours.  SCDs  Continue anti-seizure medication  Continue Decadron.

## 2019-12-22 NOTE — CARE PLAN
Problem: Safety  Goal: Will remain free from falls  Outcome: PROGRESSING AS EXPECTED  Intervention: Implement fall precautions  Flowsheets  Taken 12/22/2019 0800  Environmental Precautions: Treaded Slipper Socks on Patient;Personal Belongings, Wastebasket, Call Bell etc. in Easy Reach;Bed in Low Position  Taken 12/22/2019 0824  Chair/Bed Strip Alarm: Yes - Alarm On  Note:   Pt educated about call light instruction, meds, and safety precautions in place     Problem: Infection  Goal: Will remain free from infection  Outcome: PROGRESSING AS EXPECTED  Intervention: Implement standard precautions and perform hand washing before and after patient contact  Note:   Pt educated about hand hygiene protocols and infection prevention      Patient called and stated she is wondering about her [facility] paperwork. She states she no longer works at [employer] [facility] [employer]. She also stated that elissa has tried to reach our office since the 20th to receive medical records. I told patient we don't do them here and that gaylek[facility] will deal with all records requests. Patient is coming in to sign an DUNCAN to give soraida permission to send records to elissa.

## 2019-12-23 LAB
BASOPHILS # BLD AUTO: 0.1 % (ref 0–1.8)
BASOPHILS # BLD: 0.01 K/UL (ref 0–0.12)
EOSINOPHIL # BLD AUTO: 0 K/UL (ref 0–0.51)
EOSINOPHIL NFR BLD: 0 % (ref 0–6.9)
ERYTHROCYTE [DISTWIDTH] IN BLOOD BY AUTOMATED COUNT: 39.7 FL (ref 35.9–50)
HCT VFR BLD AUTO: 36.7 % (ref 37–47)
HGB BLD-MCNC: 12.8 G/DL (ref 12–16)
IMM GRANULOCYTES # BLD AUTO: 0.04 K/UL (ref 0–0.11)
IMM GRANULOCYTES NFR BLD AUTO: 0.4 % (ref 0–0.9)
LYMPHOCYTES # BLD AUTO: 0.9 K/UL (ref 1–4.8)
LYMPHOCYTES NFR BLD: 10.1 % (ref 22–41)
MAGNESIUM SERPL-MCNC: 2 MG/DL (ref 1.5–2.5)
MCH RBC QN AUTO: 32.6 PG (ref 27–33)
MCHC RBC AUTO-ENTMCNC: 34.9 G/DL (ref 33.6–35)
MCV RBC AUTO: 93.4 FL (ref 81.4–97.8)
MONOCYTES # BLD AUTO: 0.38 K/UL (ref 0–0.85)
MONOCYTES NFR BLD AUTO: 4.3 % (ref 0–13.4)
NEUTROPHILS # BLD AUTO: 7.59 K/UL (ref 2–7.15)
NEUTROPHILS NFR BLD: 85.1 % (ref 44–72)
NRBC # BLD AUTO: 0 K/UL
NRBC BLD-RTO: 0 /100 WBC
PLATELET # BLD AUTO: 245 K/UL (ref 164–446)
PMV BLD AUTO: 9 FL (ref 9–12.9)
RBC # BLD AUTO: 3.93 M/UL (ref 4.2–5.4)
WBC # BLD AUTO: 8.9 K/UL (ref 4.8–10.8)

## 2019-12-23 PROCEDURE — 99232 SBSQ HOSP IP/OBS MODERATE 35: CPT | Performed by: STUDENT IN AN ORGANIZED HEALTH CARE EDUCATION/TRAINING PROGRAM

## 2019-12-23 PROCEDURE — 770001 HCHG ROOM/CARE - MED/SURG/GYN PRIV*

## 2019-12-23 PROCEDURE — 36415 COLL VENOUS BLD VENIPUNCTURE: CPT

## 2019-12-23 PROCEDURE — 700111 HCHG RX REV CODE 636 W/ 250 OVERRIDE (IP): Performed by: NEUROLOGICAL SURGERY

## 2019-12-23 PROCEDURE — 700111 HCHG RX REV CODE 636 W/ 250 OVERRIDE (IP): Performed by: INTERNAL MEDICINE

## 2019-12-23 PROCEDURE — 700102 HCHG RX REV CODE 250 W/ 637 OVERRIDE(OP): Performed by: INTERNAL MEDICINE

## 2019-12-23 PROCEDURE — 83735 ASSAY OF MAGNESIUM: CPT

## 2019-12-23 PROCEDURE — A9270 NON-COVERED ITEM OR SERVICE: HCPCS | Performed by: INTERNAL MEDICINE

## 2019-12-23 PROCEDURE — 700105 HCHG RX REV CODE 258: Performed by: INTERNAL MEDICINE

## 2019-12-23 PROCEDURE — 85025 COMPLETE CBC W/AUTO DIFF WBC: CPT

## 2019-12-23 PROCEDURE — 700105 HCHG RX REV CODE 258: Performed by: NEUROLOGICAL SURGERY

## 2019-12-23 RX ADMIN — CEPHALEXIN 500 MG: 500 CAPSULE ORAL at 04:19

## 2019-12-23 RX ADMIN — DEXAMETHASONE SODIUM PHOSPHATE 4 MG: 4 INJECTION, SOLUTION INTRA-ARTICULAR; INTRALESIONAL; INTRAMUSCULAR; INTRAVENOUS; SOFT TISSUE at 01:24

## 2019-12-23 RX ADMIN — ATORVASTATIN CALCIUM 40 MG: 40 TABLET, FILM COATED ORAL at 17:07

## 2019-12-23 RX ADMIN — DEXAMETHASONE SODIUM PHOSPHATE 4 MG: 4 INJECTION, SOLUTION INTRA-ARTICULAR; INTRALESIONAL; INTRAMUSCULAR; INTRAVENOUS; SOFT TISSUE at 14:06

## 2019-12-23 RX ADMIN — SODIUM CHLORIDE: 9 INJECTION, SOLUTION INTRAVENOUS at 01:27

## 2019-12-23 RX ADMIN — DEXAMETHASONE SODIUM PHOSPHATE 4 MG: 4 INJECTION, SOLUTION INTRA-ARTICULAR; INTRALESIONAL; INTRAMUSCULAR; INTRAVENOUS; SOFT TISSUE at 07:34

## 2019-12-23 RX ADMIN — SODIUM CHLORIDE 500 MG: 9 INJECTION, SOLUTION INTRAVENOUS at 17:07

## 2019-12-23 RX ADMIN — CEPHALEXIN 500 MG: 500 CAPSULE ORAL at 14:06

## 2019-12-23 RX ADMIN — DEXAMETHASONE SODIUM PHOSPHATE 4 MG: 4 INJECTION, SOLUTION INTRA-ARTICULAR; INTRALESIONAL; INTRAMUSCULAR; INTRAVENOUS; SOFT TISSUE at 20:00

## 2019-12-23 RX ADMIN — CEPHALEXIN 500 MG: 500 CAPSULE ORAL at 10:23

## 2019-12-23 RX ADMIN — SODIUM CHLORIDE 500 MG: 9 INJECTION, SOLUTION INTRAVENOUS at 04:19

## 2019-12-23 RX ADMIN — OMEPRAZOLE 20 MG: 20 CAPSULE, DELAYED RELEASE ORAL at 04:19

## 2019-12-23 RX ADMIN — SODIUM CHLORIDE: 9 INJECTION, SOLUTION INTRAVENOUS at 19:47

## 2019-12-23 RX ADMIN — CEPHALEXIN 500 MG: 500 CAPSULE ORAL at 20:00

## 2019-12-23 ASSESSMENT — ENCOUNTER SYMPTOMS
FEVER: 0
NERVOUS/ANXIOUS: 0
DEPRESSION: 0

## 2019-12-23 NOTE — CARE PLAN
Problem: Communication  Goal: The ability to communicate needs accurately and effectively will improve  Outcome: PROGRESSING AS EXPECTED  Intervention: Educate patient and significant other/support system about the plan of care, procedures, treatments, medications and allow for questions  Note:   POC     Problem: Safety  Goal: Will remain free from falls  Outcome: PROGRESSING AS EXPECTED  Intervention: Implement fall precautions  Flowsheets  Taken 12/23/2019 0730  Environmental Precautions: Treaded Slipper Socks on Patient;Personal Belongings, Wastebasket, Call Bell etc. in Easy Reach;Bed in Low Position  Taken 12/23/2019 0732  Chair/Bed Strip Alarm: Yes - Alarm On

## 2019-12-23 NOTE — PROGRESS NOTES
Hospital Medicine Daily Progress Note    Date of Service: 12/23/2019   Hospital Day: 3 Pat. Class: Inpatient     Chief Complaint - Altered Mental Status Interval Problem Update  Patient was seen and evaluated today for Altered Mental Status  Disposition: Remain IP   Hospital Course     Direct Admission Day Course  85 y.o. female with past medical history of essential hypertension who presented 12/20/2019 with altered mental status.  She initially presented to outlying facility because surgery.  According to the patient she has been confused for the past 2 days.  The patient is accompanied by the daughter by the bedside.  The patient lives alone at home.  Currently according to the daughter she is still a little bit confused.  She stated that she has been having declining in her memory over the past few months.  The patient denies any headache, any weakness numbness over her extremities.  But she does mention that she noticed some slurred speech 2 days ago.  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 This is of high attenuation. Differential diagnostic possibilities need to include a choroidal plexus papilloma as well as intra-ventricular meningioma. MRI 17 x 14 x 19 mm is noted situated within the choroid plexus of the left lateral ventricle, within the collateral trigone. There is a mild amount of mass effect upon adjacent brain structures, but no evidence of current ventricular obstruction. NS was consulted before transfer    12/21 - Neurosurg eval today, feels mass from cord plexus. Likely biopsy pending May not be amenable to resection given pt age. Started Decadron and Keppra  12/22 - Less confusion today.  Continues on Decadron.  Repeat MRI per NS today, followed by biopsy this week. Pt will be medically cleared for surgical procedure today.  Will check EKG, update labs.  If  those are unremarkable, pt is clear for surgery  12/23 - Biopsy rescheduled to Thursday.  Encephalopathy nearly completely resolved.  Continued steroids.      Code Status: Prior  Consultants/Specialty:  Neurosurgery Procedures During Hospitalization:  None   Lines, Drains, Airways, Wounds  Lines:  Drains:  Airway:  Wounds: VTE prophylaxis: SCD ordered today        Diet Order Regular    Assessment/Plan  * Brain mass- (present on admission)  Assessment & Plan  12/23 - Biopsy has been rescheduled to Thursday.  Patient and patient's family note remarkable improvement since she was admitted.  Continue Steroids.  - Previous Plans by Other Providers -  CT head showed;  1.5 cm mass lesion associated with the left choroidal plexus in the atria of the left lateral ventricle. This is of high attenuation. Differential diagnostic possibilities need to include a choroidal plexus papilloma as well as intra-ventricular meningioma.   MRI brain 17 x 14 x 19 mm is noted situated within the choroid plexus of the left lateral ventricle, within the collateral trigone. There is a mild amount of mass effect upon adjacent brain structures, but no evidence of   current ventricular obstruction.  Dr. Newton consulted  CT chest/abd/pelvic ordered by NS  Monitor for possible seizure or new neurologic deficits  Started on decadron IV  NPO at midnight    Hyperglycemia- (present on admission)  Assessment & Plan  12/23 - stable  - Previous Plans by Other Providers -  No history of DM      Essential hypertension- (present on admission)  Assessment & Plan  12/23 - stable  - Previous Plans by Other Providers -  Holding bysystolic    Acute encephalopathy- (present on admission)  Assessment & Plan  12/23 - Much improved.  - Previous Plans by Other Providers -  Likely related to brain mass  Avoid narcotics         Laboratory  Results from last 7 days   Lab Units 12/23/19  0258 12/20/19  0645   WBC 1501 K/uL 8.9 10.6   HGB 1503 g/dL 12.8 12.6*   HCT 1504  % 36.7* 36.9*   PLATELET COUNT 1518 K/uL 245 230    Results from last 7 days   Lab Units 12/23/19  0258 12/22/19  1233 12/20/19  0645   SODIUM 101 mmol/L  --  140 136   POTASSIUM 102 mmol/L  --  3.7 3.6   CHLORIDE 103 mmol/L  --  108 102   CO2 104 mmol/L  --  24 21   ANION GAP ANION   --  8.0 17    mg/dL  --  16 11   CREATININE 109 mg/dL  --  0.79 0.6   CALCIUM 105 mg/dL  --  8.5 8.5   GLUCOSE 112 mg/dL  --  125* 124*   IF [employer] [id number] mL/min/1.73 m 2  --  >60 >60   IF NON AFR[employer] AMER 109GFRB mL/min/1.73 m 2  --  >60 >60   MAGNESIUM 561 mg/dL 2.0  --  1.7*             Intake/Output Summary (Last 24 hours) at 12/23/2019 0732  Last data filed at 12/22/2019 1821  Gross per 24 hour   Intake 1920 ml   Output --   Net 1920 ml    Vital Signs  Temp:  [36.4 °C (97.5 °F)-37.3 °C (99.2 °F)] 36.4 °C (97.5 °F)  Pulse:  [68-93] 68  Resp:  [16] 16  BP: (135-156)/(62-79) 156/79  SpO2:  [92 %-96 %] 95 %     Review of Systems  Review of Systems   Constitutional: Negative for fever and malaise/fatigue.   Skin: Negative for itching and rash.   Psychiatric/Behavioral: Negative for depression. The patient is not nervous/anxious.     Physical Exam  Physical Exam   Constitutional: No distress.   Neurological: She is alert.   Alert and oriented, appriately conversant, aware of surroundings. Not necessarily AOx4 because of confusion of timeline/day of the week, etc.  Expected actually   Skin: Skin is warm and dry. She is not diaphoretic.   Nursing note and vitals reviewed.       Medications  levETIRAcetam (KEPPRA) IV, 500 mg, Intravenous, Q12HRS  cephALEXin, 500 mg, Oral, Q6HRS  omeprazole, 20 mg, Oral, DAILY  atorvastatin, 40 mg, Oral, Q EVENING  dexamethasone, 4 mg, Intravenous, Q6HRS         Medications were reviewed today.     Imaging  CT-CHEST,ABDOMEN,PELVIS WITH   Final Result         1. No CT evidence of malignancy or metastatic disease in the chest, abdomen or pelvis.      2. Cholelithiasis.      MR-STEALTH BRAIN WITH &  W/O    (Results Pending)

## 2019-12-23 NOTE — PROGRESS NOTES
Neurosurgery Progress Note    Subjective:  No distress, denies headache, daughter at bedside    Exam:  AA, oriented x 2, conversant with some tangential/non-sensical responses, FC's briskly, mild RUe break away weakness, no drift,    BP  Min: 148/63  Max: 160/62  Pulse  Av  Min: 61  Max: 74  Resp  Av  Min: 16  Max: 16  Temp  Av.7 °C (98 °F)  Min: 36.4 °C (97.5 °F)  Max: 37.3 °C (99.2 °F)  SpO2  Av.5 %  Min: 92 %  Max: 96 %    No data recorded    Recent Labs     19  0258   WBC 8.9   RBC 3.93*   HEMOGLOBIN 12.8   HEMATOCRIT 36.7*   MCV 93.4   MCH 32.6   MCHC 34.9   RDW 39.7   PLATELETCT 245   MPV 9.0     Recent Labs     19  1233   SODIUM 140   POTASSIUM 3.7   CHLORIDE 108   CO2 24   GLUCOSE 125*   BUN 16   CREATININE 0.79   CALCIUM 8.5     Recent Labs     19  1233   INR 1.04           Intake/Output       19 0700 - 19 0659 19 0700 - 19 0659      [id number] 0002-9533 Total 0780-9274 4805-1227 Total       Intake    P.O.  720  -- 720  --  -- --    P.O. 720 -- 720 -- -- --    I.V.  1200  -- 1200  --  -- --    Volume (mL) (NS infusion) 1200 -- 1200 -- -- --    Total Intake  --  -- -- --       Output    Urine  --  -- --  --  -- --    Number of Times Voided 5 x -- 5 x 3 x -- 3 x    Stool  --  -- --  --  -- --    Number of Times Stooled 1 x -- 1 x -- -- --    Total Output -- -- -- -- -- --       Net I/O      --  -- -- --            Intake/Output Summary (Last 24 hours) at 2019 1302  Last data filed at 2019 1821  Gross per 24 hour   Intake 1200 ml   Output --   Net 1200 ml            • levETIRAcetam (KEPPRA) IV  500 mg Q12HRS   • cephALEXin  500 mg Q6HRS   • omeprazole  20 mg DAILY   • atorvastatin  40 mg Q EVENING   • dexamethasone  4 mg Q6HRS   • NS   Continuous   • acetaminophen  650 mg Q6HRS PRN   • bisacodyl  10 mg Q24HRS PRN   • dextrose 50%  50 mL PRN   • docusate sodium  100 mg BID PRN   • enalaprilat  1.25 mg Q6HRS PRN   •  hydrALAZINE  10 mg Q4HRS PRN   • lactulose  30 g Q24HRS PRN   • magnesium hydroxide  30 mL QDAY PRN   • naloxone  0.1 mg Q5 MIN PRN   • ondansetron  4 mg Q4HRS PRN   • ondansetron  4 mg Q4HRS PRN   • promethazine  12.5 mg Q4HRS PRN   • Respiratory Care per Protocol   Continuous RT   • temazepam  7.5 mg QHS PRN       Assessment and Plan:  Hospital day # confusion r/t left brain lesion  Prophylactic anticoagulation: yes         Start date/time: ok for Heparin SQ, stop last dose on day before surgery 12/26    NM: improved with steroids  Plan for bx, possible partial resection on 12/26/19 around 13:00.  Patient and daughter in agreement.  Seen with DR. Newton.

## 2019-12-23 NOTE — CARE PLAN
Problem: Infection  Goal: Will remain free from infection  Outcome: PROGRESSING AS EXPECTED     Problem: Knowledge Deficit  Goal: Knowledge of the prescribed therapeutic regimen will improve  Outcome: PROGRESSING AS EXPECTED    Patient's daughter is actively participating in her care.

## 2019-12-24 PROCEDURE — A9270 NON-COVERED ITEM OR SERVICE: HCPCS | Performed by: INTERNAL MEDICINE

## 2019-12-24 PROCEDURE — 700105 HCHG RX REV CODE 258: Performed by: STUDENT IN AN ORGANIZED HEALTH CARE EDUCATION/TRAINING PROGRAM

## 2019-12-24 PROCEDURE — 700111 HCHG RX REV CODE 636 W/ 250 OVERRIDE (IP): Performed by: INTERNAL MEDICINE

## 2019-12-24 PROCEDURE — 700105 HCHG RX REV CODE 258: Performed by: INTERNAL MEDICINE

## 2019-12-24 PROCEDURE — 700105 HCHG RX REV CODE 258: Performed by: NEUROLOGICAL SURGERY

## 2019-12-24 PROCEDURE — 700102 HCHG RX REV CODE 250 W/ 637 OVERRIDE(OP): Performed by: INTERNAL MEDICINE

## 2019-12-24 PROCEDURE — 770001 HCHG ROOM/CARE - MED/SURG/GYN PRIV*

## 2019-12-24 PROCEDURE — 99232 SBSQ HOSP IP/OBS MODERATE 35: CPT | Performed by: STUDENT IN AN ORGANIZED HEALTH CARE EDUCATION/TRAINING PROGRAM

## 2019-12-24 PROCEDURE — 700111 HCHG RX REV CODE 636 W/ 250 OVERRIDE (IP): Performed by: NEUROLOGICAL SURGERY

## 2019-12-24 RX ADMIN — CEPHALEXIN 500 MG: 500 CAPSULE ORAL at 05:04

## 2019-12-24 RX ADMIN — DEXAMETHASONE SODIUM PHOSPHATE 4 MG: 4 INJECTION, SOLUTION INTRA-ARTICULAR; INTRALESIONAL; INTRAMUSCULAR; INTRAVENOUS; SOFT TISSUE at 20:37

## 2019-12-24 RX ADMIN — SODIUM CHLORIDE: 9 INJECTION, SOLUTION INTRAVENOUS at 05:33

## 2019-12-24 RX ADMIN — DEXAMETHASONE SODIUM PHOSPHATE 4 MG: 4 INJECTION, SOLUTION INTRA-ARTICULAR; INTRALESIONAL; INTRAMUSCULAR; INTRAVENOUS; SOFT TISSUE at 01:11

## 2019-12-24 RX ADMIN — SODIUM CHLORIDE 500 MG: 9 INJECTION, SOLUTION INTRAVENOUS at 18:00

## 2019-12-24 RX ADMIN — DEXAMETHASONE SODIUM PHOSPHATE 4 MG: 4 INJECTION, SOLUTION INTRA-ARTICULAR; INTRALESIONAL; INTRAMUSCULAR; INTRAVENOUS; SOFT TISSUE at 14:15

## 2019-12-24 RX ADMIN — SODIUM CHLORIDE: 9 INJECTION, SOLUTION INTRAVENOUS at 22:27

## 2019-12-24 RX ADMIN — CEPHALEXIN 500 MG: 500 CAPSULE ORAL at 08:10

## 2019-12-24 RX ADMIN — OMEPRAZOLE 20 MG: 20 CAPSULE, DELAYED RELEASE ORAL at 05:04

## 2019-12-24 RX ADMIN — ATORVASTATIN CALCIUM 40 MG: 40 TABLET, FILM COATED ORAL at 16:13

## 2019-12-24 RX ADMIN — DEXAMETHASONE SODIUM PHOSPHATE 4 MG: 4 INJECTION, SOLUTION INTRA-ARTICULAR; INTRALESIONAL; INTRAMUSCULAR; INTRAVENOUS; SOFT TISSUE at 08:10

## 2019-12-24 RX ADMIN — SODIUM CHLORIDE 500 MG: 9 INJECTION, SOLUTION INTRAVENOUS at 05:04

## 2019-12-24 ASSESSMENT — ENCOUNTER SYMPTOMS
FEVER: 0
DEPRESSION: 0
NERVOUS/ANXIOUS: 0

## 2019-12-24 NOTE — CARE PLAN
Problem: Communication  Goal: The ability to communicate needs accurately and effectively will improve  Outcome: PROGRESSING AS EXPECTED  Intervention: Educate patient and significant other/support system about the plan of care, procedures, treatments, medications and allow for questions  Note:   POC, npo 12/25, mobility     Problem: Safety  Goal: Will remain free from falls  Outcome: PROGRESSING AS EXPECTED  Intervention: Implement fall precautions  Flowsheets (Taken 12/24/2019 0800)  Environmental Precautions: Treaded Slipper Socks on Patient;Personal Belongings, Wastebasket, Call Bell etc. in Easy Reach;Bed in Low Position  Note:   Safety precautions in place and calling appropriately

## 2019-12-24 NOTE — PROGRESS NOTES
Neurosurgery Progress Note    Subjective:  No distress, denies headache, daughter at bedside  Daughter reports improved memory with steroids     Exam:  AA, oriented x 3  NAD  PERRLA  Conversant - demonstrates no receptive/expressive aphasia   FC's briskly  No Pronator drift    BP  Min: 155/71  Max: 166/69  Pulse  Av  Min: 55  Max: 70  Resp  Av.8  Min: 16  Max: 18  Temp  Av.8 °C (98.2 °F)  Min: 36.7 °C (98 °F)  Max: 36.9 °C (98.4 °F)  SpO2  Av %  Min: 94 %  Max: 97 %    No data recorded    Recent Labs     19  0258   WBC 8.9   RBC 3.93*   HEMOGLOBIN 12.8   HEMATOCRIT 36.7*   MCV 93.4   MCH 32.6   MCHC 34.9   RDW 39.7   PLATELETCT 245   MPV 9.0     Recent Labs     19  1233   SODIUM 140   POTASSIUM 3.7   CHLORIDE 108   CO2 24   GLUCOSE 125*   BUN 16   CREATININE 0.79   CALCIUM 8.5     Recent Labs     19  1233   INR 1.04           Intake/Output       19 0700 - 19 0659 19 0700 - 19 0659      [id number] [id number] Total 2244-5056 8098-8279 Total       Intake    I.V.  1200  -- 1200  --  -- --    Volume (mL) (NS infusion) 1200 -- 1200 -- -- --    Total Intake 1200 -- 1200 -- -- --       Output    Urine  --  -- --  --  -- --    Number of Times Voided 4 x -- 4 x -- -- --    Total Output -- -- -- -- -- --       Net I/O     1200 -- 1200 -- -- --            Intake/Output Summary (Last 24 hours) at 2019 1010  Last data filed at 2019 1716  Gross per 24 hour   Intake 1200 ml   Output --   Net 1200 ml            • levETIRAcetam (KEPPRA) IV  500 mg Q12HRS   • omeprazole  20 mg DAILY   • atorvastatin  40 mg Q EVENING   • dexamethasone  4 mg Q6HRS   • NS   Continuous   • acetaminophen  650 mg Q6HRS PRN   • bisacodyl  10 mg Q24HRS PRN   • dextrose 50%  50 mL PRN   • docusate sodium  100 mg BID PRN   • enalaprilat  1.25 mg Q6HRS PRN   • hydrALAZINE  10 mg Q4HRS PRN   • lactulose  30 g Q24HRS PRN   • magnesium hydroxide  30 mL QDAY PRN   • naloxone  0.1 mg Q5 MIN PRN    • ondansetron  4 mg Q4HRS PRN   • ondansetron  4 mg Q4HRS PRN   • promethazine  12.5 mg Q4HRS PRN   • Respiratory Care per Protocol   Continuous RT   • temazepam  7.5 mg QHS PRN       Assessment and Plan:  Hospital day #4 confusion r/t left brain lesion  Prophylactic anticoagulation: yes         Start date/time: ok for Heparin SQ, stop last dose tomorrow  NPO after MN tomorrow   Risks, benefits, alternatives to surgery reviewed. PT scheduled for surgery for biopsy, possible partial resection on 12/26/19 at 13:00.      Consent ordered.    Patient and daughter in agreement.

## 2019-12-24 NOTE — PROGRESS NOTES
Hospital Medicine Daily Progress Note    Date of Service: 12/24/2019   Hospital Day: 4 Pat. Class: Inpatient     Chief Complaint - Altered Mental Status Interval Problem Update  Patient was seen and evaluated today for Altered Mental Status  Disposition: Remain IP   Hospital Course     Direct Admission Day Course  85 y.o. female with past medical history of essential hypertension who presented 12/20/2019 with altered mental status.  She initially presented to outlying facility because surgery.  According to the patient she has been confused for the past 2 days.  The patient is accompanied by the daughter by the bedside.  The patient lives alone at home.  Currently according to the daughter she is still a little bit confused.  She stated that she has been having declining in her memory over the past few months.  The patient denies any headache, any weakness numbness over her extremities.  But she does mention that she noticed some slurred speech 2 days ago.  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 This is of high attenuation. Differential diagnostic possibilities need to include a choroidal plexus papilloma as well as intra-ventricular meningioma. MRI 17 x 14 x 19 mm is noted situated within the choroid plexus of the left lateral ventricle, within the collateral trigone. There is a mild amount of mass effect upon adjacent brain structures, but no evidence of current ventricular obstruction. NS was consulted before transfer    12/21 - Neurosurg eval today, feels mass from cord plexus. Likely biopsy pending May not be amenable to resection given pt age. Started Decadron and Keppra  12/22 - Less confusion today.  Continues on Decadron.  Repeat MRI per NS today, followed by biopsy this week. Pt will be medically cleared for surgical procedure today.  Will check EKG, update labs.  If  those are unremarkable, pt is clear for surgery  12/23 - Biopsy rescheduled to Thursday.  Encephalopathy nearly completely resolved.  Continued steroids.  12/24 - Steroids continuing.  Daughter notes they had some concerns over the last few years about her mom's increasing dementia-like symptoms.   BP a bit elevated during hospitalization, not adjusting medication for now with patient improving clinically, even over yesterday.Will adjust medication after biopsy with NS guidance.    Code Status: Prior  Consultants/Specialty:  Neurosurgery Procedures During Hospitalization:  None   Lines, Drains, Airways, Wounds  Lines:  Drains:  Airway:  Wounds: VTE prophylaxis: SCD ordered today        Diet Order Regular    Assessment/Plan  * Brain mass- (present on admission)  Assessment & Plan  12/24 - Continued improvement today.  Awaiting biopsy, continue steroids.  12/23 - Biopsy has been rescheduled to Thursday.  Patient and patient's family note remarkable improvement since she was admitted.  Continue Steroids.  - Previous Plans by Other Providers -  CT head showed;  1.5 cm mass lesion associated with the left choroidal plexus in the atria of the left lateral ventricle. This is of high attenuation. Differential diagnostic possibilities need to include a choroidal plexus papilloma as well as intra-ventricular meningioma.   MRI brain 17 x 14 x 19 mm is noted situated within the choroid plexus of the left lateral ventricle, within the collateral trigone. There is a mild amount of mass effect upon adjacent brain structures, but no evidence of   current ventricular obstruction.  Dr. Newton consulted  CT chest/abd/pelvic ordered by NS  Monitor for possible seizure or new neurologic deficits  Started on decadron IV  NPO at midnight    Hyperglycemia- (present on admission)  Assessment & Plan  12/23-12/24 - stable  - Previous Plans by Other Providers -  No history of DM        Essential hypertension- (present on  admission)  Assessment & Plan  12/24 - poorly controlled in the mid 140s up to 160s while inpatient.  Permissive hypertension until after biopsy.   12/23 - stable  - Previous Plans by Other Providers -  Holding bysystolic    Acute encephalopathy- (present on admission)  Assessment & Plan  12/23-12/24 - Much improved.  - Previous Plans by Other Providers -  Likely related to brain mass  Avoid narcotics         Laboratory  Results from last 7 days   Lab Units 12/23/19  0258 12/20/19  0645   WBC 1501 K/uL 8.9 10.6   HGB 1503 g/dL 12.8 12.6*   HCT 1504 % 36.7* 36.9*   PLATELET COUNT 1518 K/uL 245 230    Results from last 7 days   Lab Units 12/23/19  0258 12/22/19  1233 12/20/19  0645   SODIUM 101 mmol/L  --  140 136   POTASSIUM 102 mmol/L  --  3.7 3.6   CHLORIDE 103 mmol/L  --  108 102   CO2 104 mmol/L  --  24 21   ANION GAP ANION   --  8.0 17    mg/dL  --  16 11   CREATININE 109 mg/dL  --  0.79 0.6   CALCIUM 105 mg/dL  --  8.5 8.5   GLUCOSE 112 mg/dL  --  125* 124*   IF SALVADOR AMER 377155 mL/min/1.73 m 2  --  >60 >60   IF NON AFRICAN AMER 109GFRB mL/min/1.73 m 2  --  >60 >60   MAGNESIUM 561 mg/dL 2.0  --  1.7*             Intake/Output Summary (Last 24 hours) at 12/24/2019 0747  Last data filed at 12/23/2019 1716  Gross per 24 hour   Intake 1200 ml   Output --   Net 1200 ml    Vital Signs  Temp:  [36.7 °C (98 °F)-36.9 °C (98.4 °F)] 36.9 °C (98.4 °F)  Pulse:  [59-70] 60  Resp:  [16-17] 16  BP: (155-165)/(60-71) 158/60  SpO2:  [94 %-97 %] 95 %     Review of Systems  Review of Systems   Constitutional: Negative for fever and malaise/fatigue.   Skin: Negative for itching and rash.   Psychiatric/Behavioral: Negative for depression. The patient is not nervous/anxious.     Physical Exam  Physical Exam   Constitutional: No distress.   Neurological: She is alert.   Alert and oriented, appriately conversant, aware of surroundings. Not necessarily AOx4 because of confusion of timeline/day of the week, etc.  Expected  actually   Skin: Skin is warm and dry. She is not diaphoretic.   Nursing note and vitals reviewed.       Medications  levETIRAcetam (KEPPRA) IV, 500 mg, Intravenous, Q12HRS  cephALEXin, 500 mg, Oral, Q6HRS  omeprazole, 20 mg, Oral, DAILY  atorvastatin, 40 mg, Oral, Q EVENING  dexamethasone, 4 mg, Intravenous, Q6HRS         Medications were reviewed today.     Imaging  MR-STEALTH BRAIN WITH & W/O   Final Result      1.  17 mm heterogeneously enhancing mass at the left trigone/choroid plexus concordant with findings on outside films MRI.   2.  Moderate cerebral atrophy. Age-appropriate.      CT-CHEST,ABDOMEN,PELVIS WITH   Final Result         1. No CT evidence of malignancy or metastatic disease in the chest, abdomen or pelvis.      2. Cholelithiasis.

## 2019-12-25 PROCEDURE — A9270 NON-COVERED ITEM OR SERVICE: HCPCS | Performed by: STUDENT IN AN ORGANIZED HEALTH CARE EDUCATION/TRAINING PROGRAM

## 2019-12-25 PROCEDURE — 700102 HCHG RX REV CODE 250 W/ 637 OVERRIDE(OP): Performed by: INTERNAL MEDICINE

## 2019-12-25 PROCEDURE — 700102 HCHG RX REV CODE 250 W/ 637 OVERRIDE(OP): Performed by: STUDENT IN AN ORGANIZED HEALTH CARE EDUCATION/TRAINING PROGRAM

## 2019-12-25 PROCEDURE — 770001 HCHG ROOM/CARE - MED/SURG/GYN PRIV*

## 2019-12-25 PROCEDURE — A9270 NON-COVERED ITEM OR SERVICE: HCPCS | Performed by: INTERNAL MEDICINE

## 2019-12-25 PROCEDURE — 700105 HCHG RX REV CODE 258: Performed by: NEUROLOGICAL SURGERY

## 2019-12-25 PROCEDURE — 700105 HCHG RX REV CODE 258: Performed by: STUDENT IN AN ORGANIZED HEALTH CARE EDUCATION/TRAINING PROGRAM

## 2019-12-25 PROCEDURE — 99232 SBSQ HOSP IP/OBS MODERATE 35: CPT | Performed by: STUDENT IN AN ORGANIZED HEALTH CARE EDUCATION/TRAINING PROGRAM

## 2019-12-25 PROCEDURE — 700111 HCHG RX REV CODE 636 W/ 250 OVERRIDE (IP): Performed by: NEUROLOGICAL SURGERY

## 2019-12-25 PROCEDURE — 700111 HCHG RX REV CODE 636 W/ 250 OVERRIDE (IP): Performed by: INTERNAL MEDICINE

## 2019-12-25 RX ORDER — LISINOPRIL 10 MG/1
10 TABLET ORAL
Status: DISCONTINUED | OUTPATIENT
Start: 2019-12-25 | End: 2019-12-28 | Stop reason: HOSPADM

## 2019-12-25 RX ADMIN — SODIUM CHLORIDE 500 MG: 9 INJECTION, SOLUTION INTRAVENOUS at 04:51

## 2019-12-25 RX ADMIN — ATORVASTATIN CALCIUM 40 MG: 40 TABLET, FILM COATED ORAL at 16:44

## 2019-12-25 RX ADMIN — OMEPRAZOLE 20 MG: 20 CAPSULE, DELAYED RELEASE ORAL at 04:51

## 2019-12-25 RX ADMIN — LISINOPRIL 10 MG: 20 TABLET ORAL at 09:12

## 2019-12-25 RX ADMIN — SODIUM CHLORIDE 500 MG: 9 INJECTION, SOLUTION INTRAVENOUS at 16:44

## 2019-12-25 RX ADMIN — SODIUM CHLORIDE: 9 INJECTION, SOLUTION INTRAVENOUS at 20:03

## 2019-12-25 RX ADMIN — DEXAMETHASONE SODIUM PHOSPHATE 4 MG: 4 INJECTION, SOLUTION INTRA-ARTICULAR; INTRALESIONAL; INTRAMUSCULAR; INTRAVENOUS; SOFT TISSUE at 08:26

## 2019-12-25 RX ADMIN — DEXAMETHASONE SODIUM PHOSPHATE 4 MG: 4 INJECTION, SOLUTION INTRA-ARTICULAR; INTRALESIONAL; INTRAMUSCULAR; INTRAVENOUS; SOFT TISSUE at 01:31

## 2019-12-25 RX ADMIN — DEXAMETHASONE SODIUM PHOSPHATE 4 MG: 4 INJECTION, SOLUTION INTRA-ARTICULAR; INTRALESIONAL; INTRAMUSCULAR; INTRAVENOUS; SOFT TISSUE at 13:53

## 2019-12-25 RX ADMIN — DEXAMETHASONE SODIUM PHOSPHATE 4 MG: 4 INJECTION, SOLUTION INTRA-ARTICULAR; INTRALESIONAL; INTRAMUSCULAR; INTRAVENOUS; SOFT TISSUE at 20:00

## 2019-12-25 ASSESSMENT — PATIENT HEALTH QUESTIONNAIRE - PHQ9
2. FEELING DOWN, DEPRESSED, IRRITABLE, OR HOPELESS: NOT AT ALL
SUM OF ALL RESPONSES TO PHQ9 QUESTIONS 1 AND 2: 0
1. LITTLE INTEREST OR PLEASURE IN DOING THINGS: NOT AT ALL

## 2019-12-25 ASSESSMENT — ENCOUNTER SYMPTOMS
DEPRESSION: 0
FEVER: 0
NERVOUS/ANXIOUS: 0

## 2019-12-25 NOTE — PROGRESS NOTES
Neurosurgery Progress Note    Subjective:  No changes overnight    Exam:  AA, oriented x 3  NAD, pleasant  PERRLA  Demonstrates no receptive/expressive aphasia  FC's briskly  No Pronator drift    BP  Min: 154/62  Max: 170/72  Pulse  Av.3  Min: 53  Max: 62  Resp  Av.3  Min: 16  Max: 17  Temp  Av.9 °C (98.4 °F)  Min: 36.6 °C (97.8 °F)  Max: 37.4 °C (99.4 °F)  SpO2  Av %  Min: 93 %  Max: 95 %    No data recorded    Recent Labs     19  0258   WBC 8.9   RBC 3.93*   HEMOGLOBIN 12.8   HEMATOCRIT 36.7*   MCV 93.4   MCH 32.6   MCHC 34.9   RDW 39.7   PLATELETCT 245   MPV 9.0     Recent Labs     19  1233   SODIUM 140   POTASSIUM 3.7   CHLORIDE 108   CO2 24   GLUCOSE 125*   BUN 16   CREATININE 0.79   CALCIUM 8.5     Recent Labs     19  1233   INR 1.04           Intake/Output       19 0700 - 19 0659 19 0700 - 19 0659      3551-1985 2192-6497 Total 2386-2844 7414-7686 Total       Intake    I.V.  600  -- 600  --  -- --    Volume (mL) (NS infusion) 600 -- 600 -- -- --    Total Intake 600 -- 600 -- -- --       Output    Urine  --  -- --  --  -- --    Number of Times Voided 5 x 3 x 8 x 6 x -- 6 x    Total Output -- -- -- -- -- --       Net I/O     600 -- 600 -- -- --            Intake/Output Summary (Last 24 hours) at 2019 0811  Last data filed at 2019 1618  Gross per 24 hour   Intake 600 ml   Output --   Net 600 ml            • levETIRAcetam (KEPPRA) IV  500 mg Q12HRS   • omeprazole  20 mg DAILY   • atorvastatin  40 mg Q EVENING   • dexamethasone  4 mg Q6HRS   • NS   Continuous   • acetaminophen  650 mg Q6HRS PRN   • bisacodyl  10 mg Q24HRS PRN   • dextrose 50%  50 mL PRN   • docusate sodium  100 mg BID PRN   • enalaprilat  1.25 mg Q6HRS PRN   • hydrALAZINE  10 mg Q4HRS PRN   • lactulose  30 g Q24HRS PRN   • magnesium hydroxide  30 mL QDAY PRN   • naloxone  0.1 mg Q5 MIN PRN   • ondansetron  4 mg Q4HRS PRN   • ondansetron  4 mg Q4HRS PRN   • promethazine  12.5  mg Q4HRS PRN   • Respiratory Care per Protocol   Continuous RT   • temazepam  7.5 mg QHS PRN       Assessment and Plan:  Hospital day #5 confusion r/t left brain lesion  Prophylactic anticoagulation: yes         Start date/time: ok for Heparin SQ, stop last dose tomorrow  NPO after MN today  Risks, benefits, alternatives to surgery reviewed.   PT scheduled for surgery for biopsy, possible partial resection tomorrow at 13:00.     Consent ordered.  Patient agrees with treatment plan  Please ambulate pt with assistance and have SCDs    Pt seen with Dr. Newton

## 2019-12-25 NOTE — PROGRESS NOTES
Hospital Medicine Daily Progress Note    Date of Service: 12/25/2019   Hospital Day: 5 Pat. Class: Inpatient     Chief Complaint - Altered Mental Status Interval Problem Update  Patient was seen and evaluated today for Altered Mental Status  Disposition: Remain IP   Hospital Course     Direct Admission Day Course  85 y.o. female with past medical history of essential hypertension who presented 12/20/2019 with altered mental status.  She initially presented to outlying facility because surgery.  According to the patient she has been confused for the past 2 days.  The patient is accompanied by the daughter by the bedside.  The patient lives alone at home.  Currently according to the daughter she is still a little bit confused.  She stated that she has been having declining in her memory over the past few months.  The patient denies any headache, any weakness numbness over her extremities.  But she does mention that she noticed some slurred speech 2 days ago.  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 This is of high attenuation. Differential diagnostic possibilities need to include a choroidal plexus papilloma as well as intra-ventricular meningioma. MRI 17 x 14 x 19 mm is noted situated within the choroid plexus of the left lateral ventricle, within the collateral trigone. There is a mild amount of mass effect upon adjacent brain structures, but no evidence of current ventricular obstruction. NS was consulted before transfer    12/21 - Neurosurg eval today, feels mass from cord plexus. Likely biopsy pending May not be amenable to resection given pt age. Started Decadron and Keppra  12/22 - Less confusion today.  Continues on Decadron.  Repeat MRI per NS today, followed by biopsy this week. Pt will be medically cleared for surgical procedure today.  Will check EKG, update labs.  If  those are unremarkable, pt is clear for surgery  12/23 - Biopsy rescheduled to Thursday.  Encephalopathy nearly completely resolved.  Continued steroids.  12/24 - Steroids continuing.  Daughter notes they had some concerns over the last few years about her mom's increasing dementia-like symptoms.   BP a bit elevated during hospitalization, not adjusting medication for now with patient improving clinically, even over yesterday.Will adjust medication after biopsy with NS guidance.  12/25 - BP remains persistently elevated today, starting 10mg lisinopril.  Small improvements in cognition, ambulating halls.  Biopsy tomorrow.    Code Status: Prior  Consultants/Specialty:  Neurosurgery Procedures During Hospitalization:  None   Lines, Drains, Airways, Wounds  Lines:  Drains:  Airway:  Wounds: VTE prophylaxis: SCD ordered today        Diet Order Regular  Diet NPO    Assessment/Plan  * Brain mass- (present on admission)  Assessment & Plan  12/25 - patient ambulating halls, daughter communicates improvement in cognition.  Biopsy scheduled for tomorrow @1300  12/24 - Continued improvement today.  Awaiting biopsy, continue steroids.  12/23 - Biopsy has been rescheduled to Thursday.  Patient and patient's family note remarkable improvement since she was admitted.  Continue Steroids.  - Previous Plans by Other Providers -  CT head showed;  1.5 cm mass lesion associated with the left choroidal plexus in the atria of the left lateral ventricle. This is of high attenuation. Differential diagnostic possibilities need to include a choroidal plexus papilloma as well as intra-ventricular meningioma.   MRI brain 17 x 14 x 19 mm is noted situated within the choroid plexus of the left lateral ventricle, within the collateral trigone. There is a mild amount of mass effect upon adjacent brain structures, but no evidence of   current ventricular obstruction.  Dr. Newton consulted  CT chest/abd/pelvic ordered by NS  Monitor for possible  seizure or new neurologic deficits  Started on decadron IV  NPO at midnight    Hyperglycemia- (present on admission)  Assessment & Plan  12/23-12/25 - stable  - Previous Plans by Other Providers -  No history of DM        Essential hypertension- (present on admission)  Assessment & Plan  12/25 - continues to be poorly controlled, patient states not on any blood pressure medication prior to hospitalization.  Starting lisinopril 10mg today.  12/24 - poorly controlled in the mid 140s up to 160s while inpatient.  Permissive hypertension until after biopsy.   12/23 - stable  - Previous Plans by Other Providers -  Holding bysystolic    Acute encephalopathy- (present on admission)  Assessment & Plan  12/23-12/25 - Much improved.  - Previous Plans by Other Providers -  Likely related to brain mass  Avoid narcotics         Laboratory  Results from last 7 days   Lab Units 12/23/19  0258 12/20/19  0645   WBC 1501 K/uL 8.9 10.6   HGB 1503 g/dL 12.8 12.6*   HCT 1504 % 36.7* 36.9*   PLATELET COUNT 1518 K/uL 245 230    Results from last 7 days   Lab Units 12/23/19  0258 12/22/19  1233 12/20/19  0645   SODIUM 101 mmol/L  --  140 136   POTASSIUM 102 mmol/L  --  3.7 3.6   CHLORIDE 103 mmol/L  --  108 102   CO2 104 mmol/L  --  24 21   ANION GAP ANION   --  8.0 17    mg/dL  --  16 11   CREATININE 109 mg/dL  --  0.79 0.6   CALCIUM 105 mg/dL  --  8.5 8.5   GLUCOSE 112 mg/dL  --  125* 124*   IF SALV[employer]OR AMER 100875 mL/min/1.73 m 2  --  >60 >60   IF NON AFRICAN AMER 109GFRB mL/min/1.73 m 2  --  >60 >60   MAGNESIUM 561 mg/dL 2.0  --  1.7*             Intake/Output Summary (Last 24 hours) at 12/25/2019 0835  Last data filed at 12/24/2019 1618  Gross per 24 hour   Intake 600 ml   Output --   Net 600 ml    Vital Signs  Temp:  [36.6 °C (97.8 °F)-37.4 °C (99.4 °F)] 37.4 °C (99.4 °F)  Pulse:  [53-62] 54  Resp:  [16-17] 16  BP: (154-170)/(62-72) 170/72  SpO2:  [93 %-95 %] 93 %     Review of Systems  Review of Systems   Constitutional:  Negative for fever and malaise/fatigue.   Skin: Negative for itching and rash.   Psychiatric/Behavioral: Negative for depression. The patient is not nervous/anxious.     Physical Exam  Physical Exam   Constitutional: No distress.   Neurological: She is alert.   Alert and oriented, appriately conversant, aware of surroundings. Not necessarily AOx4 because of confusion of timeline/day of the week, etc.  Expected actually   Skin: Skin is warm and dry. She is not diaphoretic.   Nursing note and vitals reviewed.       Medications  lisinopril, 10 mg, Oral, Q DAY  levETIRAcetam (KEPPRA) IV, 500 mg, Intravenous, Q12HRS  omeprazole, 20 mg, Oral, DAILY  atorvastatin, 40 mg, Oral, Q EVENING  dexamethasone, 4 mg, Intravenous, Q6HRS         Medications were reviewed today.     Imaging  MR-STEALTH BRAIN WITH & W/O   Final Result      1.  17 mm heterogeneously enhancing mass at the left trigone/choroid plexus concordant with findings on outside films MRI.   2.  Moderate cerebral atrophy. Age-appropriate.      CT-CHEST,ABDOMEN,PELVIS WITH   Final Result         1. No CT evidence of malignancy or metastatic disease in the chest, abdomen or pelvis.      2. Cholelithiasis.

## 2019-12-25 NOTE — CARE PLAN
Problem: Safety  Goal: Will remain free from injury  Outcome: PROGRESSING AS EXPECTED  Note:   Safety precaution in effect. Non skid socks in use. Reminded patient to call for assist. Discussed with patient and daughter. Call light within reach. Reminded patient to call for assist. Hourly rounds in effect. Verbalized understanding.     Problem: Infection  Goal: Will remain free from infection  Outcome: PROGRESSING AS EXPECTED  Note:   Implement Standard precaution. Hand washing every encounter & before & after patient care. Discussed with patient and daughter. Verbalized understanding.     Problem: Knowledge Deficit  Goal: Knowledge of disease process/condition, treatment plan, diagnostic tests, and medications will improve  Outcome: PROGRESSING AS EXPECTED  Note:   Discussed Plan of care with the patient and daughter. Questions answered. Verbalized understanding.

## 2019-12-25 NOTE — PROGRESS NOTES
0650 Patient's sitting up in bed. Bedside report received from LAM Aleman RN at th beginning of the shift. Daughter visiting.     0742 [facility] Mancia notified Primary Nurse that patient's BP - 170/72.    0750 Dr De La Torre visited. POC discussed with the patient and daughter. Notified the said MD of the elevated BP - 170/72, per MD, he'll check on it.    0756  Dr Newton and WARD Ramirez visited. POC discussed with the patient and daughter.    0820 Patient's sitting up in bed, having Breakfast. Noted confusion, re oriented. Fall Protocol in effect. Call light within reach. Reminded patient to call for assist. Assessment completed. No distress noted. Plan of care reviewed with the patient and daughter. Verbalized understanding.     0816 New order received and acknowledged from WARD Ramirez - SCD's. SCd's initiated. Educated of the importance. Verbalized understanding.    0835 New order received and acknowledged from Dr De La Torre (see MAR - Lisinopril).    0950 Patient ambulated in the hallway accompanied by daughter, noted with steady gait.    1159 Re checked /60.    1210 Patient's sitting up in bed, having lunch. No distress noted.    1430 Patient ambulated in the hallway accompanied by daughter.    1745 Patient's sitting up in bed, having Dinner. No distress noted.

## 2019-12-26 ENCOUNTER — ANESTHESIA EVENT (OUTPATIENT)
Dept: SURGERY | Facility: MEDICAL CENTER | Age: 84
DRG: 026 | End: 2019-12-26
Payer: MEDICARE

## 2019-12-26 ENCOUNTER — APPOINTMENT (OUTPATIENT)
Dept: RADIOLOGY | Facility: MEDICAL CENTER | Age: 84
DRG: 026 | End: 2019-12-26
Attending: NEUROLOGICAL SURGERY
Payer: MEDICARE

## 2019-12-26 ENCOUNTER — APPOINTMENT (OUTPATIENT)
Dept: RADIOLOGY | Facility: MEDICAL CENTER | Age: 84
DRG: 026 | End: 2019-12-26
Attending: NURSE PRACTITIONER
Payer: MEDICARE

## 2019-12-26 ENCOUNTER — ANESTHESIA (OUTPATIENT)
Dept: SURGERY | Facility: MEDICAL CENTER | Age: 84
DRG: 026 | End: 2019-12-26
Payer: MEDICARE

## 2019-12-26 LAB
ABO + RH BLD: NORMAL
ABO GROUP BLD: NORMAL
BLD GP AB SCN SERPL QL: NORMAL
PATHOLOGY CONSULT NOTE: NORMAL
RH BLD: NORMAL

## 2019-12-26 PROCEDURE — 700105 HCHG RX REV CODE 258: Performed by: ANESTHESIOLOGY

## 2019-12-26 PROCEDURE — 88342 IMHCHEM/IMCYTCHM 1ST ANTB: CPT

## 2019-12-26 PROCEDURE — 160002 HCHG RECOVERY MINUTES (STAT): Performed by: NEUROLOGICAL SURGERY

## 2019-12-26 PROCEDURE — 500889 HCHG PACK, NEURO: Performed by: NEUROLOGICAL SURGERY

## 2019-12-26 PROCEDURE — 99232 SBSQ HOSP IP/OBS MODERATE 35: CPT | Performed by: STUDENT IN AN ORGANIZED HEALTH CARE EDUCATION/TRAINING PROGRAM

## 2019-12-26 PROCEDURE — 70450 CT HEAD/BRAIN W/O DYE: CPT

## 2019-12-26 PROCEDURE — A6222 GAUZE <=16 IN NO W/SAL W/O B: HCPCS | Performed by: NEUROLOGICAL SURGERY

## 2019-12-26 PROCEDURE — 700102 HCHG RX REV CODE 250 W/ 637 OVERRIDE(OP): Performed by: NEUROLOGICAL SURGERY

## 2019-12-26 PROCEDURE — 110454 HCHG SHELL REV 250: Performed by: NEUROLOGICAL SURGERY

## 2019-12-26 PROCEDURE — 160036 HCHG PACU - EA ADDL 30 MINS PHASE I: Performed by: NEUROLOGICAL SURGERY

## 2019-12-26 PROCEDURE — 160048 HCHG OR STATISTICAL LEVEL 1-5: Performed by: NEUROLOGICAL SURGERY

## 2019-12-26 PROCEDURE — A9270 NON-COVERED ITEM OR SERVICE: HCPCS | Performed by: NEUROLOGICAL SURGERY

## 2019-12-26 PROCEDURE — 700111 HCHG RX REV CODE 636 W/ 250 OVERRIDE (IP): Performed by: INTERNAL MEDICINE

## 2019-12-26 PROCEDURE — 00B70ZX EXCISION OF CEREBRAL HEMISPHERE, OPEN APPROACH, DIAGNOSTIC: ICD-10-PCS | Performed by: NEUROLOGICAL SURGERY

## 2019-12-26 PROCEDURE — 86850 RBC ANTIBODY SCREEN: CPT

## 2019-12-26 PROCEDURE — 71046 X-RAY EXAM CHEST 2 VIEWS: CPT

## 2019-12-26 PROCEDURE — 770022 HCHG ROOM/CARE - ICU (200)

## 2019-12-26 PROCEDURE — 500331 HCHG COTTONOID, SURG PATTIE: Performed by: NEUROLOGICAL SURGERY

## 2019-12-26 PROCEDURE — 160035 HCHG PACU - 1ST 60 MINS PHASE I: Performed by: NEUROLOGICAL SURGERY

## 2019-12-26 PROCEDURE — 700101 HCHG RX REV CODE 250: Performed by: NEUROLOGICAL SURGERY

## 2019-12-26 PROCEDURE — C1713 ANCHOR/SCREW BN/BN,TIS/BN: HCPCS | Performed by: NEUROLOGICAL SURGERY

## 2019-12-26 PROCEDURE — 700112 HCHG RX REV CODE 229: Performed by: NEUROLOGICAL SURGERY

## 2019-12-26 PROCEDURE — A6404 STERILE GAUZE > 48 SQ IN: HCPCS | Performed by: NEUROLOGICAL SURGERY

## 2019-12-26 PROCEDURE — 700102 HCHG RX REV CODE 250 W/ 637 OVERRIDE(OP): Performed by: INTERNAL MEDICINE

## 2019-12-26 PROCEDURE — 501445 HCHG STAPLER, SKIN DISP: Performed by: NEUROLOGICAL SURGERY

## 2019-12-26 PROCEDURE — 88323 CONSLTJ&REPRT MATRL PREP SLD: CPT

## 2019-12-26 PROCEDURE — 99291 CRITICAL CARE FIRST HOUR: CPT | Performed by: INTERNAL MEDICINE

## 2019-12-26 PROCEDURE — A9270 NON-COVERED ITEM OR SERVICE: HCPCS | Performed by: INTERNAL MEDICINE

## 2019-12-26 PROCEDURE — 700102 HCHG RX REV CODE 250 W/ 637 OVERRIDE(OP): Performed by: STUDENT IN AN ORGANIZED HEALTH CARE EDUCATION/TRAINING PROGRAM

## 2019-12-26 PROCEDURE — 86900 BLOOD TYPING SEROLOGIC ABO: CPT

## 2019-12-26 PROCEDURE — 700105 HCHG RX REV CODE 258: Performed by: NEUROLOGICAL SURGERY

## 2019-12-26 PROCEDURE — 160009 HCHG ANES TIME/MIN: Performed by: NEUROLOGICAL SURGERY

## 2019-12-26 PROCEDURE — 700111 HCHG RX REV CODE 636 W/ 250 OVERRIDE (IP): Performed by: NEUROLOGICAL SURGERY

## 2019-12-26 PROCEDURE — 160031 HCHG SURGERY MINUTES - 1ST 30 MINS LEVEL 5: Performed by: NEUROLOGICAL SURGERY

## 2019-12-26 PROCEDURE — 86901 BLOOD TYPING SEROLOGIC RH(D): CPT

## 2019-12-26 PROCEDURE — 700101 HCHG RX REV CODE 250: Performed by: ANESTHESIOLOGY

## 2019-12-26 PROCEDURE — 500075 HCHG BLADE, CLIPPER NEURO: Performed by: NEUROLOGICAL SURGERY

## 2019-12-26 PROCEDURE — 8E09XBZ COMPUTER ASSISTED PROCEDURE OF HEAD AND NECK REGION: ICD-10-PCS | Performed by: NEUROLOGICAL SURGERY

## 2019-12-26 PROCEDURE — 700111 HCHG RX REV CODE 636 W/ 250 OVERRIDE (IP): Performed by: ANESTHESIOLOGY

## 2019-12-26 PROCEDURE — 36415 COLL VENOUS BLD VENIPUNCTURE: CPT

## 2019-12-26 PROCEDURE — 88307 TISSUE EXAM BY PATHOLOGIST: CPT

## 2019-12-26 PROCEDURE — A9270 NON-COVERED ITEM OR SERVICE: HCPCS | Performed by: STUDENT IN AN ORGANIZED HEALTH CARE EDUCATION/TRAINING PROGRAM

## 2019-12-26 PROCEDURE — 500445 HCHG HEMOSTAT, SURGICEL 4X8: Performed by: NEUROLOGICAL SURGERY

## 2019-12-26 PROCEDURE — 160042 HCHG SURGERY MINUTES - EA ADDL 1 MIN LEVEL 5: Performed by: NEUROLOGICAL SURGERY

## 2019-12-26 PROCEDURE — 88331 PATH CONSLTJ SURG 1 BLK 1SPC: CPT

## 2019-12-26 DEVICE — PLATE NC BURR HOLE COVER 10MM (6NCX6=36): Type: IMPLANTABLE DEVICE | Status: FUNCTIONAL

## 2019-12-26 DEVICE — SCREW STRYK NC 1.5X4MM (6NCX40=240) CONSIGNED QTY 240 PRE-LOAD 80/PK: Type: IMPLANTABLE DEVICE | Status: FUNCTIONAL

## 2019-12-26 RX ORDER — MEPERIDINE HYDROCHLORIDE 25 MG/ML
6.25 INJECTION INTRAMUSCULAR; INTRAVENOUS; SUBCUTANEOUS
Status: DISCONTINUED | OUTPATIENT
Start: 2019-12-26 | End: 2019-12-26 | Stop reason: HOSPADM

## 2019-12-26 RX ORDER — SODIUM CHLORIDE 9 MG/ML
INJECTION, SOLUTION INTRAVENOUS CONTINUOUS
Status: DISCONTINUED | OUTPATIENT
Start: 2019-12-26 | End: 2019-12-28 | Stop reason: HOSPADM

## 2019-12-26 RX ORDER — CLONIDINE HYDROCHLORIDE 0.1 MG/1
0.1 TABLET ORAL EVERY 4 HOURS PRN
Status: DISCONTINUED | OUTPATIENT
Start: 2019-12-26 | End: 2019-12-28 | Stop reason: HOSPADM

## 2019-12-26 RX ORDER — LABETALOL HYDROCHLORIDE 5 MG/ML
5 INJECTION, SOLUTION INTRAVENOUS
Status: DISCONTINUED | OUTPATIENT
Start: 2019-12-26 | End: 2019-12-26 | Stop reason: HOSPADM

## 2019-12-26 RX ORDER — HYDROMORPHONE HYDROCHLORIDE 1 MG/ML
0.4 INJECTION, SOLUTION INTRAMUSCULAR; INTRAVENOUS; SUBCUTANEOUS
Status: DISCONTINUED | OUTPATIENT
Start: 2019-12-26 | End: 2019-12-26 | Stop reason: HOSPADM

## 2019-12-26 RX ORDER — HYDROMORPHONE HYDROCHLORIDE 1 MG/ML
0.1 INJECTION, SOLUTION INTRAMUSCULAR; INTRAVENOUS; SUBCUTANEOUS
Status: DISCONTINUED | OUTPATIENT
Start: 2019-12-26 | End: 2019-12-26 | Stop reason: HOSPADM

## 2019-12-26 RX ORDER — AMOXICILLIN 250 MG
1 CAPSULE ORAL NIGHTLY
Status: DISCONTINUED | OUTPATIENT
Start: 2019-12-26 | End: 2019-12-28 | Stop reason: HOSPADM

## 2019-12-26 RX ORDER — SODIUM CHLORIDE, SODIUM LACTATE, POTASSIUM CHLORIDE, CALCIUM CHLORIDE 600; 310; 30; 20 MG/100ML; MG/100ML; MG/100ML; MG/100ML
INJECTION, SOLUTION INTRAVENOUS
Status: DISCONTINUED | OUTPATIENT
Start: 2019-12-26 | End: 2019-12-26 | Stop reason: SURG

## 2019-12-26 RX ORDER — METOPROLOL TARTRATE 1 MG/ML
1 INJECTION, SOLUTION INTRAVENOUS
Status: DISCONTINUED | OUTPATIENT
Start: 2019-12-26 | End: 2019-12-26 | Stop reason: HOSPADM

## 2019-12-26 RX ORDER — AMOXICILLIN 250 MG
1 CAPSULE ORAL
Status: DISCONTINUED | OUTPATIENT
Start: 2019-12-26 | End: 2019-12-28 | Stop reason: HOSPADM

## 2019-12-26 RX ORDER — BACITRACIN 50000 [IU]/1
INJECTION, POWDER, FOR SOLUTION INTRAMUSCULAR
Status: DISCONTINUED | OUTPATIENT
Start: 2019-12-26 | End: 2019-12-26 | Stop reason: HOSPADM

## 2019-12-26 RX ORDER — DOCUSATE SODIUM 100 MG/1
100 CAPSULE, LIQUID FILLED ORAL 2 TIMES DAILY
Status: DISCONTINUED | OUTPATIENT
Start: 2019-12-26 | End: 2019-12-28 | Stop reason: HOSPADM

## 2019-12-26 RX ORDER — CEFOTETAN DISODIUM 2 G/20ML
INJECTION, POWDER, FOR SOLUTION INTRAMUSCULAR; INTRAVENOUS PRN
Status: DISCONTINUED | OUTPATIENT
Start: 2019-12-26 | End: 2019-12-26 | Stop reason: SURG

## 2019-12-26 RX ORDER — ACETAMINOPHEN 500 MG
1000 TABLET ORAL EVERY 6 HOURS
Status: DISCONTINUED | OUTPATIENT
Start: 2019-12-26 | End: 2019-12-28 | Stop reason: HOSPADM

## 2019-12-26 RX ORDER — BACITRACIN ZINC 500 [USP'U]/G
OINTMENT TOPICAL
Status: DISCONTINUED | OUTPATIENT
Start: 2019-12-26 | End: 2019-12-26 | Stop reason: HOSPADM

## 2019-12-26 RX ORDER — DIPHENHYDRAMINE HYDROCHLORIDE 50 MG/ML
12.5 INJECTION INTRAMUSCULAR; INTRAVENOUS
Status: DISCONTINUED | OUTPATIENT
Start: 2019-12-26 | End: 2019-12-26 | Stop reason: HOSPADM

## 2019-12-26 RX ORDER — BISACODYL 10 MG
10 SUPPOSITORY, RECTAL RECTAL
Status: DISCONTINUED | OUTPATIENT
Start: 2019-12-26 | End: 2019-12-28 | Stop reason: HOSPADM

## 2019-12-26 RX ORDER — SCOLOPAMINE TRANSDERMAL SYSTEM 1 MG/1
1 PATCH, EXTENDED RELEASE TRANSDERMAL
Status: DISCONTINUED | OUTPATIENT
Start: 2019-12-26 | End: 2019-12-28 | Stop reason: HOSPADM

## 2019-12-26 RX ORDER — HALOPERIDOL 5 MG/ML
1 INJECTION INTRAMUSCULAR
Status: DISCONTINUED | OUTPATIENT
Start: 2019-12-26 | End: 2019-12-26 | Stop reason: HOSPADM

## 2019-12-26 RX ORDER — POLYETHYLENE GLYCOL 3350 17 G/17G
1 POWDER, FOR SOLUTION ORAL 2 TIMES DAILY PRN
Status: DISCONTINUED | OUTPATIENT
Start: 2019-12-26 | End: 2019-12-28 | Stop reason: HOSPADM

## 2019-12-26 RX ORDER — DEXAMETHASONE SODIUM PHOSPHATE 4 MG/ML
4 INJECTION, SOLUTION INTRA-ARTICULAR; INTRALESIONAL; INTRAMUSCULAR; INTRAVENOUS; SOFT TISSUE
Status: DISCONTINUED | OUTPATIENT
Start: 2019-12-26 | End: 2019-12-28 | Stop reason: HOSPADM

## 2019-12-26 RX ORDER — HYDRALAZINE HYDROCHLORIDE 20 MG/ML
5 INJECTION INTRAMUSCULAR; INTRAVENOUS
Status: DISCONTINUED | OUTPATIENT
Start: 2019-12-26 | End: 2019-12-26 | Stop reason: HOSPADM

## 2019-12-26 RX ORDER — HYDROMORPHONE HYDROCHLORIDE 1 MG/ML
0.2 INJECTION, SOLUTION INTRAMUSCULAR; INTRAVENOUS; SUBCUTANEOUS
Status: DISCONTINUED | OUTPATIENT
Start: 2019-12-26 | End: 2019-12-26 | Stop reason: HOSPADM

## 2019-12-26 RX ORDER — BUPIVACAINE HYDROCHLORIDE AND EPINEPHRINE 5; 5 MG/ML; UG/ML
INJECTION, SOLUTION EPIDURAL; INTRACAUDAL; PERINEURAL
Status: DISCONTINUED | OUTPATIENT
Start: 2019-12-26 | End: 2019-12-26 | Stop reason: HOSPADM

## 2019-12-26 RX ORDER — ENEMA 19; 7 G/133ML; G/133ML
1 ENEMA RECTAL
Status: DISCONTINUED | OUTPATIENT
Start: 2019-12-26 | End: 2019-12-28 | Stop reason: HOSPADM

## 2019-12-26 RX ORDER — ONDANSETRON 2 MG/ML
4 INJECTION INTRAMUSCULAR; INTRAVENOUS
Status: DISCONTINUED | OUTPATIENT
Start: 2019-12-26 | End: 2019-12-26 | Stop reason: HOSPADM

## 2019-12-26 RX ORDER — MAGNESIUM SULFATE HEPTAHYDRATE 40 MG/ML
INJECTION, SOLUTION INTRAVENOUS PRN
Status: DISCONTINUED | OUTPATIENT
Start: 2019-12-26 | End: 2019-12-26 | Stop reason: SURG

## 2019-12-26 RX ADMIN — DEXAMETHASONE SODIUM PHOSPHATE 4 MG: 4 INJECTION, SOLUTION INTRA-ARTICULAR; INTRALESIONAL; INTRAMUSCULAR; INTRAVENOUS; SOFT TISSUE at 07:31

## 2019-12-26 RX ADMIN — DEXAMETHASONE SODIUM PHOSPHATE 8 MG: 4 INJECTION, SOLUTION INTRA-ARTICULAR; INTRALESIONAL; INTRAMUSCULAR; INTRAVENOUS; SOFT TISSUE at 13:41

## 2019-12-26 RX ADMIN — SODIUM CHLORIDE: 9 INJECTION, SOLUTION INTRAVENOUS at 17:48

## 2019-12-26 RX ADMIN — PROPOFOL 100 MG: 10 INJECTION, EMULSION INTRAVENOUS at 13:41

## 2019-12-26 RX ADMIN — CEFOTETAN DISODIUM 2 G: 2 INJECTION, POWDER, FOR SOLUTION INTRAMUSCULAR; INTRAVENOUS at 13:34

## 2019-12-26 RX ADMIN — MAGNESIUM SULFATE IN WATER 2 G: 40 INJECTION, SOLUTION INTRAVENOUS at 13:41

## 2019-12-26 RX ADMIN — DEXAMETHASONE SODIUM PHOSPHATE 4 MG: 4 INJECTION, SOLUTION INTRA-ARTICULAR; INTRALESIONAL; INTRAMUSCULAR; INTRAVENOUS; SOFT TISSUE at 02:01

## 2019-12-26 RX ADMIN — SODIUM CHLORIDE, POTASSIUM CHLORIDE, SODIUM LACTATE AND CALCIUM CHLORIDE: 600; 310; 30; 20 INJECTION, SOLUTION INTRAVENOUS at 13:34

## 2019-12-26 RX ADMIN — LISINOPRIL 10 MG: 20 TABLET ORAL at 05:54

## 2019-12-26 RX ADMIN — PROPOFOL 50 MG: 10 INJECTION, EMULSION INTRAVENOUS at 13:43

## 2019-12-26 RX ADMIN — ROCURONIUM BROMIDE 100 MG: 10 INJECTION, SOLUTION INTRAVENOUS at 13:41

## 2019-12-26 RX ADMIN — DOCUSATE SODIUM 100 MG: 100 CAPSULE, LIQUID FILLED ORAL at 17:48

## 2019-12-26 RX ADMIN — ATORVASTATIN CALCIUM 40 MG: 40 TABLET, FILM COATED ORAL at 17:48

## 2019-12-26 RX ADMIN — ONDANSETRON 4 MG: 2 INJECTION INTRAMUSCULAR; INTRAVENOUS at 13:41

## 2019-12-26 RX ADMIN — SODIUM CHLORIDE 500 MG: 9 INJECTION, SOLUTION INTRAVENOUS at 17:47

## 2019-12-26 RX ADMIN — FENTANYL CITRATE 50 MCG: 50 INJECTION, SOLUTION INTRAMUSCULAR; INTRAVENOUS at 13:48

## 2019-12-26 RX ADMIN — LIDOCAINE HYDROCHLORIDE 80 MG: 20 INJECTION, SOLUTION INTRAVENOUS at 13:41

## 2019-12-26 RX ADMIN — VANCOMYCIN HYDROCHLORIDE 1100 MG: 500 INJECTION, POWDER, LYOPHILIZED, FOR SOLUTION INTRAVENOUS at 20:11

## 2019-12-26 RX ADMIN — DEXAMETHASONE SODIUM PHOSPHATE 4 MG: 4 INJECTION, SOLUTION INTRA-ARTICULAR; INTRALESIONAL; INTRAMUSCULAR; INTRAVENOUS; SOFT TISSUE at 20:12

## 2019-12-26 RX ADMIN — SODIUM CHLORIDE 500 MG: 9 INJECTION, SOLUTION INTRAVENOUS at 05:54

## 2019-12-26 RX ADMIN — FENTANYL CITRATE 50 MCG: 50 INJECTION, SOLUTION INTRAMUSCULAR; INTRAVENOUS at 13:41

## 2019-12-26 RX ADMIN — FENTANYL CITRATE 25 MCG: 50 INJECTION, SOLUTION INTRAMUSCULAR; INTRAVENOUS at 15:02

## 2019-12-26 RX ADMIN — OMEPRAZOLE 20 MG: 20 CAPSULE, DELAYED RELEASE ORAL at 05:54

## 2019-12-26 ASSESSMENT — ENCOUNTER SYMPTOMS
DEPRESSION: 0
NERVOUS/ANXIOUS: 0
FEVER: 0

## 2019-12-26 ASSESSMENT — PAIN SCALES - GENERAL: PAIN_LEVEL: 0

## 2019-12-26 NOTE — CARE PLAN
Problem: Communication  Goal: The ability to communicate needs accurately and effectively will improve  Outcome: PROGRESSING AS EXPECTED  Note:   Effectively communicates needs with staff     Problem: Venous Thromboembolism (VTW)/Deep Vein Thrombosis (DVT) Prevention:  Goal: Patient will participate in Venous Thrombosis (VTE)/Deep Vein Thrombosis (DVT)Prevention Measures  Outcome: PROGRESSING AS EXPECTED  Note:   SCDs in place while in bed     Problem: Pain Management  Goal: Pain level will decrease to patient's comfort goal  Outcome: PROGRESSING AS EXPECTED  Note:   Denies pain. Patient aware of availability of pain medications

## 2019-12-26 NOTE — OR NURSING
Vss. Pt alert and oriented to person- most times, sometimes oriented to event, disoriented to place or time. PERRLA. 4+ strength in upper and lower extremities.  Family updated via Dr. White. Surgical site to L scalp is open to air and closed with dermabond. Pt tolerating sips of water. Denies pain or nausea.

## 2019-12-26 NOTE — ANESTHESIA PROCEDURE NOTES
Airway  Date/Time: 12/26/2019 1:41 PM  Performed by: Ibrahima Rosado M.D.  Authorized by: Ibrahima Rosado M.D.     Location:  OR  Urgency:  Elective  Indications for Airway Management:  Respiratory failure  Spontaneous Ventilation: absent    Preoxygenated: Yes    Patient Position:  Sniffing  Final Airway Type:  Endotracheal airway  Final Endotracheal Airway:  ETT  Cuffed: Yes    Technique Used for Successful ETT Placement:  Direct laryngoscopy  Devices/Methods Used in Placement:  Cricoid pressure  Insertion Site:  Oral  Blade Type:  Christine  Laryngoscope Blade/Videolaryngoscope Blade Size:  3  ETT Size (mm):  7.0  Measured from:  Teeth  ETT to Teeth (cm):  21  Placement Verified by: auscultation and capnometry    Cormack-Lehane Classification:  Grade I - full view of glottis  Number of Attempts at Approach:  1

## 2019-12-26 NOTE — PROGRESS NOTES
Patient's exam remains stable. Chart, labs, diagnostics reviewed. NPO for surgery today around 1:00 PM.

## 2019-12-26 NOTE — ANESTHESIA PREPROCEDURE EVALUATION
Relevant Problems   CARDIAC   (+) Essential hypertension      Other   (+) Acute encephalopathy   (+) Brain mass   (+) Hyperglycemia       Physical Exam    Airway   Mallampati: II  TM distance: >3 FB  Neck ROM: full       Cardiovascular - normal exam  Rhythm: regular  Rate: normal  (-) murmur     Dental - normal exam         Pulmonary - normal exam  Breath sounds clear to auscultation     Abdominal    Neurological - normal exam                 Anesthesia Plan    ASA 3       Plan - general             Induction: intravenous    Postoperative Plan: Postoperative administration of opioids is intended.    Pertinent diagnostic labs and testing reviewed    Informed Consent:    Anesthetic plan and risks discussed with patient.    Use of blood products discussed with: patient whom consented to blood products.

## 2019-12-26 NOTE — PROGRESS NOTES
0655 Patient's sitting up in bed. Bedside report received from LAM Carranza RN at the beginning of the shift. Remains NPO for surgery today. Daughter at the bedside.    0720 Patient's sitting up in bed. Remains NPO. Noted confusion, re oriented. Fall Protocol in effect. Call light within reach. Reminded patient to call for assist. Assessment completed. No distress noted. Plan of care reviewed with the patient and daughter. Verbalized understanding.    0818 Report given to Natali Pre Op RN over the phone.    0900 Dr De La Torre visited. POC discussed with the patient and daughter.    1003 New order received and acknowledged for 2 views CXR from DHARA Armstrong. Notified patient and daughter. Verbalized understanding.    1050 Patient left for X Ray via w/c accompanied y one male Transport.    1108 Patient came back from X Ray via w/c accompanied by one male Transport.    1246 Patient left for surgery via bed accompanied by one male transport & daughter via bed.

## 2019-12-26 NOTE — ANESTHESIA TIME REPORT
Anesthesia Start and Stop Event Times     Date Time Event    12/26/2019 1334 Anesthesia Start     1528 Anesthesia Stop        Responsible Staff  12/26/19    Name Role Begin End    Ibrahima Rosado M.D. Anesth 1334 1446    Samy Real M.D. Anesth 1446 1528        Preop Diagnosis (Free Text):  Pre-op Diagnosis     Left-sided temporal region mass        Preop Diagnosis (Codes):    Post op Diagnosis  Intracranial mass      Premium Reason  B. 1st Call    Comments:

## 2019-12-26 NOTE — CARE PLAN
Problem: Safety  Goal: Will remain free from injury  Outcome: PROGRESSING AS EXPECTED  Note:   Safety precaution in effect. Non skid socks in use. Call light within reach. Reminded patient to call for assist. Patient's confused, re oriented. Hourly rounds in effect. Discussed with patient and daughter. Verbalized understanding.     Problem: Infection  Goal: Will remain free from infection  Outcome: PROGRESSING AS EXPECTED  Note:   Implement Standard precaution. Hand washing every encounter & before & after patient care. Verbalized understanding.     Problem: Knowledge Deficit  Goal: Knowledge of disease process/condition, treatment plan, diagnostic tests, and medications will improve  Outcome: PROGRESSING AS EXPECTED  Note:   Discussed Plan of care with the patient and daughter. Questions answered. Verrbalized understanding.

## 2019-12-26 NOTE — OR SURGEON
Immediate Post OP Note    PreOp Diagnosis: Left temporal region brain mass    PostOp Diagnosis: Left temporal region brain mass    Procedure(s):  CRANIOTOMY, USING FRAMELESS STEREOTAXY - FOR TEMPORAL NAVIGUS BIOPSY, - Wound Class: Clean    Surgeon(s):  Mario Newton D.O.    Anesthesiologist/Type of Anesthesia:  Anesthesiologist: Ibrahima Rosado M.D.; Samy Real M.D./General    Surgical Staff:  Circulator: Sagar Garza R.N.  Relief Circulator: Kia Peguero R.N.  Scrub Person: Lilibeth Stanley  Steroxanna : Elham Law    Specimens removed if any:  ID Type Source Tests Collected by Time Destination   A : Left Temoral Region mass Other Other PATHOLOGY SPECIMEN Mario Newton D.O. 12/26/2019  2:38 PM    B : Left Temoral Region mass Other Other PATHOLOGY SPECIMEN Mario Newton D.O. 12/26/2019  2:59 PM        Estimated Blood Loss: 20 cc    Findings: Navigus biopsy of left temporal region mass.  Good specimens obtained and sent to pathology.  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    Complications: None        12/26/2019 3:52 PM Mario Newton D.O.

## 2019-12-26 NOTE — PROGRESS NOTES
1855 Patient's in bed. No changes in status. Bedside report given to Oncoming NOC RN (Izzy ROMERO)

## 2019-12-26 NOTE — OP REPORT
Procedure performed on the: Left temporal     DATE OF SURGERY:  12/26/2019  SURGEON: Dr. Mario Newton D.O.  ASSISTANT: None  PREOPERATIVE DIAGNOSIS:  Left temporal (lobe) mass.  POSTOPERATIVE DIAGNOSIS:  Left temporal (lobe) mass.  PROCEDURE PERFORMED:   Left temporal (lobe) stereotactic craniotomy with brain tumor biopsy.     ANESTHESIA: GETA.  ESTIMATED BLOOD LOSS: 20 cc.  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  DRAINS: None.   COMPLICATIONS: None.  DISPOSITION: Stable to the PACU.     INDICATIONS FOR THE PROCEDURE     HISTORY: Ms. Garza is an 85-year-old female who presents with signs, symptoms and radiographic  evidence of a  left temporal region brain mass.  Patient has had recent memory loss according to her daughter and herself.  She otherwise does not have any focal deficits.  A CT scan and then an MRI was performed of brain which showed evidence of a left temporal region mass causing edema in the temporal region.  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  However, I was not convinced that this was the case due to imaging findings.  Patient was scheduled for a Navigus biopsy after her chest abdomen pelvis CT scan was negative for any masses.  Patient and the family are in agreement with the plan.  Due to patient's age and the fact that it is left-sided dominant hemisphere tumor I do not believe an aggressive heroic resection is indicated as that would leave her quality of life more of a detriment as it is now with possible receptive and expressive aphasia and memory loss issues.  Therefore at this time a Navigus biopsy will be performed with further recommendations to follow post procedure.  .      DIAGNOSTIC STUDIES: MRI showed a 17 mm heterogeneously enhancing mass at the left trigone/choroid plexus concordant with findings on outside films MRI.  Moderate cerebral atrophy. Age-appropriate.     SURGICAL RISKS: The patient and her daughter was well apprised  of all objectives, benefits, risks and potential complications of the procedure, including but not limited to: worsening of current status, the possible need for further procedures, the risk of infection, headaches, CSF leak, seizures, hemorrhage, stroke, loss of language function, paralysis, coma and even death. Informed consent was obtained and secured in the chart after the patient and her daughter voiced understanding of these risks and decided to proceed with the operation     DESCRIPTION OF THE PROCEDURE  The patient was transferred to the operating room. The patient was given preoperative prophylactic IV antibiotics.     ANESTHESIA: The patient was sedated and intubated without difficulty by the anesthesia service. Eyes were taped shut after ointment was applied to prevent corneal abrasion. A Kathe Hugger was placed over the exposed lower body to maintain control of core body temperature.     POSITIONING: A Bay head clamp was applied. The patient was positioned supine with the head in mild flexion. All pressure points were carefully padded. The hair was clipped over the area where the patient was to undergo the incision. Pre-prepping was done with alcohol. Stereotactic MRI was done preoperatively and the images were transferred to the neuronavigational system. Next, three-dimensional images were reconstructed. The patient underwent co-registration of the preoperative stereotactic MRI with the patient’s surface landmarks. Accuracy was within 2.5mm.     Vital structure landmarks including the motor area/speech area/superior sagittal sinus were identified and mapped out. The planned sherice hole craniotomy was outlined.      OPERATIVE TECHNIQUE  The patient was prepped and draped in standard sterile fashion. Local anesthesic was infiltrated along the planned and marked incision. A 3cm straight incision was made sharply with a # 10 surgical scalpel to the level of the periosteum. Hemostasis was achieved  utilizing bipolar electrocautery. The scalp was reflected laterally and held in place with a self-retaining retractor. At this point, the neuronavigational probe was utilized to outline the trajectory towards the lesion to be biopsied. Utilizing the pneumatic high speed drill and a 14 mm  drill bit, a sherice hole craniotomy was performed approximately 14 mm in size.      The dura was coagulated with the bipolar electrocautery. The frameless stereotactic biopsy guide was inserted and attached to the skull with three 5 mm screws. The guide component was then fastened to this base with a (plastic) hand screw.      Again, with the neuronavigational probe, the desired biopsy depth was determined. The lesion was visualized on three-dimensional imaging. The depth of the lesion from the surface of the skull was calculated to be approximately 53 mm in depth. Taking into account the 40 mm of the biopsy guide, total depth from the top of the biopsy guide was calculated to be approximately 93 mm in depth.      A disposable biopsy needle was marked at 93 Mms and inserted to target with the cutting window in the closed position.  4 core biopsies were obtained at 3, 6, 9, and 12 o'clock regions and sent to pathology.   Another 4 core biopsies were obtained and sent to pathology for permanent section at the request of the pathologist.  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     The biopsy needle gently removed. The wound was irrigated until clear and hemostasis achieved utilizing bipolar electrocautery. A sherice hole cover was placed over the sherice hole craniotomy and fastened to the skull with screws. The wound was irrigated with antibiotic saline solution until clear. Hemostasis was achieved and the galea was closed utilizing inverted interrupted 2-0 polyglactin synthetic absorbable suture (Vicryl). The skin was then approximated utilizing 4-0 rapide vicryl suture followed by dermabond and a thin layer of  bacitracin ointment.      All needle counts, sponge counts and instrument counts were correct at the end of the case times two. The patient tolerated the procedure well and was transferred to the recovery room in stable condition.    In the PACU the patient is at baseline neurologically moving all extremities pupils equal round and reactive to light bilaterally no headaches at this time.

## 2019-12-27 ENCOUNTER — HOSPITAL ENCOUNTER (OUTPATIENT)
Dept: RADIOLOGY | Facility: MEDICAL CENTER | Age: 84
End: 2019-12-27
Attending: NEUROLOGICAL SURGERY

## 2019-12-27 LAB
ANION GAP SERPL CALC-SCNC: 8 MMOL/L (ref 0–11.9)
BUN SERPL-MCNC: 21 MG/DL (ref 8–22)
CALCIUM SERPL-MCNC: 8.2 MG/DL (ref 8.5–10.5)
CHLORIDE SERPL-SCNC: 106 MMOL/L (ref 96–112)
CO2 SERPL-SCNC: 25 MMOL/L (ref 20–33)
CREAT SERPL-MCNC: 0.78 MG/DL (ref 0.5–1.4)
ERYTHROCYTE [DISTWIDTH] IN BLOOD BY AUTOMATED COUNT: 41.2 FL (ref 35.9–50)
GLUCOSE SERPL-MCNC: 130 MG/DL (ref 65–99)
HCT VFR BLD AUTO: 40.3 % (ref 37–47)
HGB BLD-MCNC: 14 G/DL (ref 12–16)
MCH RBC QN AUTO: 32.8 PG (ref 27–33)
MCHC RBC AUTO-ENTMCNC: 34.7 G/DL (ref 33.6–35)
MCV RBC AUTO: 94.4 FL (ref 81.4–97.8)
PLATELET # BLD AUTO: 233 K/UL (ref 164–446)
PMV BLD AUTO: 9.1 FL (ref 9–12.9)
POTASSIUM SERPL-SCNC: 3.6 MMOL/L (ref 3.6–5.5)
RBC # BLD AUTO: 4.27 M/UL (ref 4.2–5.4)
SODIUM SERPL-SCNC: 139 MMOL/L (ref 135–145)
WBC # BLD AUTO: 12.4 K/UL (ref 4.8–10.8)

## 2019-12-27 PROCEDURE — 80048 BASIC METABOLIC PNL TOTAL CA: CPT

## 2019-12-27 PROCEDURE — A9270 NON-COVERED ITEM OR SERVICE: HCPCS | Performed by: STUDENT IN AN ORGANIZED HEALTH CARE EDUCATION/TRAINING PROGRAM

## 2019-12-27 PROCEDURE — 700112 HCHG RX REV CODE 229: Performed by: NEUROLOGICAL SURGERY

## 2019-12-27 PROCEDURE — 700111 HCHG RX REV CODE 636 W/ 250 OVERRIDE (IP): Performed by: NEUROLOGICAL SURGERY

## 2019-12-27 PROCEDURE — 51798 US URINE CAPACITY MEASURE: CPT

## 2019-12-27 PROCEDURE — 99232 SBSQ HOSP IP/OBS MODERATE 35: CPT | Performed by: STUDENT IN AN ORGANIZED HEALTH CARE EDUCATION/TRAINING PROGRAM

## 2019-12-27 PROCEDURE — 700105 HCHG RX REV CODE 258: Performed by: NEUROLOGICAL SURGERY

## 2019-12-27 PROCEDURE — 700102 HCHG RX REV CODE 250 W/ 637 OVERRIDE(OP): Performed by: NEUROLOGICAL SURGERY

## 2019-12-27 PROCEDURE — 700111 HCHG RX REV CODE 636 W/ 250 OVERRIDE (IP): Performed by: INTERNAL MEDICINE

## 2019-12-27 PROCEDURE — 700102 HCHG RX REV CODE 250 W/ 637 OVERRIDE(OP): Performed by: STUDENT IN AN ORGANIZED HEALTH CARE EDUCATION/TRAINING PROGRAM

## 2019-12-27 PROCEDURE — A9270 NON-COVERED ITEM OR SERVICE: HCPCS | Performed by: NEUROLOGICAL SURGERY

## 2019-12-27 PROCEDURE — 85027 COMPLETE CBC AUTOMATED: CPT

## 2019-12-27 PROCEDURE — 70450 CT HEAD/BRAIN W/O DYE: CPT

## 2019-12-27 PROCEDURE — A9270 NON-COVERED ITEM OR SERVICE: HCPCS | Performed by: INTERNAL MEDICINE

## 2019-12-27 PROCEDURE — 770001 HCHG ROOM/CARE - MED/SURG/GYN PRIV*

## 2019-12-27 PROCEDURE — 700102 HCHG RX REV CODE 250 W/ 637 OVERRIDE(OP): Performed by: INTERNAL MEDICINE

## 2019-12-27 RX ADMIN — ENALAPRILAT 1.25 MG: 1.25 INJECTION INTRAVENOUS at 00:38

## 2019-12-27 RX ADMIN — DEXAMETHASONE SODIUM PHOSPHATE 4 MG: 4 INJECTION, SOLUTION INTRA-ARTICULAR; INTRALESIONAL; INTRAMUSCULAR; INTRAVENOUS; SOFT TISSUE at 15:19

## 2019-12-27 RX ADMIN — DEXAMETHASONE SODIUM PHOSPHATE 4 MG: 4 INJECTION, SOLUTION INTRA-ARTICULAR; INTRALESIONAL; INTRAMUSCULAR; INTRAVENOUS; SOFT TISSUE at 02:25

## 2019-12-27 RX ADMIN — MINERAL OIL AND PETROLATUM 1 APPLICATION: 150; 830 OINTMENT OPHTHALMIC at 17:07

## 2019-12-27 RX ADMIN — LISINOPRIL 10 MG: 20 TABLET ORAL at 05:01

## 2019-12-27 RX ADMIN — DEXAMETHASONE SODIUM PHOSPHATE 4 MG: 4 INJECTION, SOLUTION INTRA-ARTICULAR; INTRALESIONAL; INTRAMUSCULAR; INTRAVENOUS; SOFT TISSUE at 07:56

## 2019-12-27 RX ADMIN — SODIUM CHLORIDE: 9 INJECTION, SOLUTION INTRAVENOUS at 05:12

## 2019-12-27 RX ADMIN — SODIUM CHLORIDE 500 MG: 9 INJECTION, SOLUTION INTRAVENOUS at 05:01

## 2019-12-27 RX ADMIN — DEXAMETHASONE SODIUM PHOSPHATE 4 MG: 4 INJECTION, SOLUTION INTRA-ARTICULAR; INTRALESIONAL; INTRAMUSCULAR; INTRAVENOUS; SOFT TISSUE at 20:42

## 2019-12-27 RX ADMIN — SODIUM CHLORIDE 500 MG: 9 INJECTION, SOLUTION INTRAVENOUS at 17:07

## 2019-12-27 RX ADMIN — SODIUM CHLORIDE: 9 INJECTION, SOLUTION INTRAVENOUS at 15:46

## 2019-12-27 RX ADMIN — DOCUSATE SODIUM 100 MG: 100 CAPSULE, LIQUID FILLED ORAL at 17:13

## 2019-12-27 RX ADMIN — ATORVASTATIN CALCIUM 40 MG: 40 TABLET, FILM COATED ORAL at 17:12

## 2019-12-27 RX ADMIN — ACETAMINOPHEN 1000 MG: 500 TABLET ORAL at 17:10

## 2019-12-27 ASSESSMENT — ENCOUNTER SYMPTOMS
EYE DISCHARGE: 0
VOMITING: 0
EYE PAIN: 0
NAUSEA: 0
DEPRESSION: 0
SEIZURES: 0
SHORTNESS OF BREATH: 0
HEADACHES: 0
FEVER: 0
NECK PAIN: 0
EYE REDNESS: 0
CHILLS: 0
ABDOMINAL PAIN: 0
MYALGIAS: 0
DIAPHORESIS: 0
STRIDOR: 0
NERVOUS/ANXIOUS: 0
HEMOPTYSIS: 0
BRUISES/BLEEDS EASILY: 0
HALLUCINATIONS: 0
LOSS OF CONSCIOUSNESS: 0
COUGH: 0
PALPITATIONS: 0

## 2019-12-27 ASSESSMENT — LIFESTYLE VARIABLES: SUBSTANCE_ABUSE: 0

## 2019-12-27 NOTE — PROGRESS NOTES
Hospital Medicine Daily Progress Note    Date of Service: 12/26/2019   Hospital Day: 6 Pat. Class: Inpatient     Chief Complaint - Altered Mental Status Interval Problem Update  Patient was seen and evaluated today for Altered Mental Status  Disposition: Remain IP   Hospital Course     Direct Admission Day Course  85 y.o. female with past medical history of essential hypertension who presented 12/20/2019 with altered mental status.  She initially presented to outlying facility because surgery.  According to the patient she has been confused for the past 2 days.  The patient is accompanied by the daughter by the bedside.  The patient lives alone at home.  Currently according to the daughter she is still a little bit confused.  She stated that she has been having declining in her memory over the past few months.  The patient denies any headache, any weakness numbness over her extremities.  But she does mention that she noticed some slurred speech 2 days ago.  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 This is of high attenuation. Differential diagnostic possibilities need to include a choroidal plexus papilloma as well as intra-ventricular meningioma. MRI 17 x 14 x 19 mm is noted situated within the choroid plexus of the left lateral ventricle, within the collateral trigone. There is a mild amount of mass effect upon adjacent brain structures, but no evidence of current ventricular obstruction. NS was consulted before transfer    12/21 - Neurosurg eval today, feels mass from cord plexus. Likely biopsy pending May not be amenable to resection given pt age. Started Decadron and Keppra  12/22 - Less confusion today.  Continues on Decadron.  Repeat MRI per NS today, followed by biopsy this week. Pt will be medically cleared for surgical procedure today.  Will check EKG, update labs.  If  "those are unremarkable, pt is clear for surgery  12/23 - Biopsy rescheduled to Thursday.  Encephalopathy nearly completely resolved.  Continued steroids.  12/24 - Steroids continuing.  Daughter notes they had some concerns over the last few years about her mom's increasing dementia-like symptoms.   BP a bit elevated during hospitalization, not adjusting medication for now with patient improving clinically, even over yesterday.Will adjust medication after biopsy with NS guidance.  12/25 - BP remains persistently elevated today, starting 10mg lisinopril.  Small improvements in cognition, ambulating halls.  Biopsy tomorrow.  12/26 - leveled off improvement cognitively.  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    Code Status: Full Code  Consultants/Specialty:  Neurosurgery Procedures During Hospitalization:  12/26 - Left temporal (lobe) stereotactic craniotomy with brain tumor biopsy.   Lines, Drains, Airways, Wounds  Lines:  Drains:  Airway:  Wounds: VTE prophylaxis: SCD ordered today        Diet Order Full Liquid    Assessment/Plan  * Brain mass- (present on admission)  Assessment & Plan  12/26 - Patient seems to have flatlined in her improvement.  No changes at all now.  Biopsy today.  12/25 - patient ambulating halls, daughter communicates improvement in cognition.  Biopsy scheduled for tomorrow @1300  12/24 - Continued improvement today.  Awaiting biopsy, continue steroids.  12/23 - Biopsy has been rescheduled to Thursday.  Patient and patient's family note remarkable improvement since she was admitted.  Continue Steroids.  - Previous Plans by Other Providers -  CT head showed;  1.5 cm mass lesion associated with the left choroidal plexus in the atria of the left lateral ventricle. This is of high attenuation. Differential diagnostic " possibilities need to include a choroidal plexus papilloma as well as intra-ventricular meningioma.   MRI brain 17 x 14 x 19 mm is noted situated within the choroid plexus of the left lateral ventricle, within the collateral trigone. There is a mild amount of mass effect upon adjacent brain structures, but no evidence of   current ventricular obstruction.  Dr. Newton consulted  CT chest/abd/pelvic ordered by NS  Monitor for possible seizure or new neurologic deficits  Started on decadron IV  NPO at midnight    Hyperglycemia- (present on admission)  Assessment & Plan  12/23-12/26 - stable  - Previous Plans by Other Providers -  No history of DM        Essential hypertension- (present on admission)  Assessment & Plan  12/26 - stable  12/25 - continues to be poorly controlled, patient states not on any blood pressure medication prior to hospitalization.  Starting lisinopril 10mg today.  12/24 - poorly controlled in the mid 140s up to 160s while inpatient.  Permissive hypertension until after biopsy.   12/23 - stable  - Previous Plans by Other Providers -  Holding bysystolic    Acute encephalopathy- (present on admission)  Assessment & Plan  12/26 - stable  12/23-12/25 - Much improved.  - Previous Plans by Other Providers -  Likely related to brain mass  Avoid narcotics         Laboratory  Results from last 7 days   Lab Units 12/23/19  0258 12/20/19  0645   WBC 1501 K/uL 8.9 10.6   HGB 1503 g/dL 12.8 12.6*   HCT 1504 % 36.7* 36.9*   PLATELET COUNT 1518 K/uL 245 230    Results from last 7 days   Lab Units 12/23/19  0258 12/22/19  1233 12/20/19  0645   SODIUM 101 mmol/L  --  140 136   POTASSIUM 102 mmol/L  --  3.7 3.6   CHLORIDE 103 mmol/L  --  108 102   CO2 104 mmol/L  --  24 21   ANION GAP ANION   --  8.0 17    mg/dL  --  16 11   CREATININE 109 mg/dL  --  0.79 0.6   CALCIUM 105 mg/dL  --  8.5 8.5   GLUCOSE 112 mg/dL  --  125* 124*   IF [employer] [id number] mL/min/1.73 m 2  --  >60 >60   IF NON [employer]  109GFRB mL/min/1.73 m 2  --  >60 >60   MAGNESIUM 561 mg/dL 2.0  --  1.7*             Intake/Output Summary (Last 24 hours) at 12/26/2019 2133  Last data filed at 12/26/2019 2030  Gross per 24 hour   Intake 1240 ml   Output 1160 ml   Net 80 ml    Vital Signs  Temp:  [36 °C (96.8 °F)-37.6 °C (99.6 °F)] 36.7 °C (98 °F)  Pulse:  [54-89] 56  Resp:  [10-31] 10  BP: (128-157)/(63-75) 139/67  SpO2:  [92 %-100 %] 99 %     Review of Systems  Review of Systems   Constitutional: Negative for fever and malaise/fatigue.   Skin: Negative for itching and rash.   Psychiatric/Behavioral: Negative for depression. The patient is not nervous/anxious.     Physical Exam  Physical Exam   Constitutional: No distress.   Neurological: She is alert.   Alert and oriented, appriately conversant, aware of surroundings. Not necessarily AOx4 because of confusion of timeline/day of the week, etc.  Expected actually   Skin: Skin is warm and dry. She is not diaphoretic.   Nursing note and vitals reviewed.       Medications  docusate sodium, 100 mg, Oral, BID  senna-docusate, 1 Tab, Oral, Nightly  artificial tears, 1 Application, Both Eyes, Q8HRS  acetaminophen, 1,000 mg, Oral, Q6HRS  MD Alert...Vancomycin per Pharmacy, , Other, PHARMACY TO DOSE  vancomycin, 15 mg/kg, Intravenous, Once  lisinopril, 10 mg, Oral, Q DAY  levETIRAcetam (KEPPRA) IV, 500 mg, Intravenous, Q12HRS  omeprazole, 20 mg, Oral, DAILY  atorvastatin, 40 mg, Oral, Q EVENING  dexamethasone, 4 mg, Intravenous, Q6HRS         Medications were reviewed today.     Imaging  CT-HEAD W/O   Final Result         1. Small amount of blood and gas in the left parietal region, in keeping with postbiopsy change.      2. 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      3. No midline shift.      DX-CHEST-2 VIEWS   Final Result      No pulmonary consolidation.      MR-STEALTH BRAIN WITH & W/O   Final Result      1.  17 mm heterogeneously enhancing mass at the left trigone/choroid plexus  concordant with findings on outside films MRI.   2.  Moderate cerebral atrophy. Age-appropriate.      CT-CHEST,ABDOMEN,PELVIS WITH   Final Result         1. No CT evidence of malignancy or metastatic disease in the chest, abdomen or pelvis.      2. Cholelithiasis.      CT-HEAD W/O    (Results Pending)

## 2019-12-27 NOTE — THERAPY
OT robinsonal attempted, per RN pt just got back to chair and is feeling dizzy. Upon rounding back, pt transferring off unit.

## 2019-12-27 NOTE — CARE PLAN
Problem: Skin Integrity  Goal: Risk for impaired skin integrity will decrease  Note:   Turn pt Q2H, assess skin under devices and pad bony prominences.      Problem: Pain Management  Goal: Pain level will decrease to patient's comfort goal  Note:   Assess pts pain level and treat as needed.

## 2019-12-27 NOTE — CARE PLAN
Problem: Infection  Goal: Will remain free from infection  Intervention: Assess signs and symptoms of infection  Note:   RN monitors Pt VS and lab values to observe for S/S of infection.  Hand hygiene implemented before and after Pt care.       Problem: Knowledge Deficit  Goal: Knowledge of the prescribed therapeutic regimen will improve  Intervention: Discuss information regarding therpeutic regimen and document in education  Note:   Reviewing plan of care, activities, and medication with patient.  Encouraging patient to ask questions and participate in plan of care.  Providing answers to all questions.  Continuing with current plan of care.  Hourly rounding in practice.

## 2019-12-27 NOTE — PROGRESS NOTES
Pt transported in w/c with CCT & all belongings. All pt & pt daughter's questions and concerns addressed at this time. Report given to Zahraa WILSON.

## 2019-12-27 NOTE — ANESTHESIA POSTPROCEDURE EVALUATION
Patient: Vera Garza    Procedure Summary     Date:  12/26/19 Room / Location:  [facility] 07 / SURGERY [facility]    Anesthesia Start:  1334 Anesthesia Stop:  1528    Procedure:  CRANIOTOMY, USING FRAMELESS STEREOTAXY - FOR TEMPORAL NAVIGUS BIOPSY, (Left Head) Diagnosis:  (Left-sided temporal region mass)    Surgeon:  Mario Newton D.O. Responsible Provider:  Samy Real M.D.    Anesthesia Type:  general ASA Status:  3          Final Anesthesia Type: general  Last vitals  BP   Blood Pressure : 151/75    Temp   36.2 °C (97.2 °F)    Pulse   Pulse: 76   Resp   14    SpO2   96 %      Anesthesia Post Evaluation    Patient location during evaluation: PACU  Patient participation: complete - patient participated  Level of consciousness: awake and alert  Pain score: 0    Airway patency: patent  Anesthetic complications: no  Cardiovascular status: hemodynamically stable  Respiratory status: acceptable  Hydration status: euvolemic    PONV: none           Nurse Pain Score: 0 (NPRS)

## 2019-12-27 NOTE — PROGRESS NOTES
Neurosurgery Progress Note    Subjective:  Denies headache    Exam:  Oriented to person, not place, time, expressive aphasia, with word finding deficit, circumlocution,  No motor deficit, CN grossly intact  Surgical site: CDI    BP  Min: 117/70  Max: 180/79  Pulse  Av.3  Min: 49  Max: 89  Resp  Av.1  Min: 10  Max: 93  Temp  Av.5 °C (97.7 °F)  Min: 36 °C (96.8 °F)  Max: 37.6 °C (99.6 °F)  SpO2  Av.5 %  Min: 92 %  Max: 100 %    No data recorded    Recent Labs     19  0610   WBC 12.4*   RBC 4.27   HEMOGLOBIN 14.0   HEMATOCRIT 40.3   MCV 94.4   MCH 32.8   MCHC 34.7   RDW 41.2   PLATELETCT 233   MPV 9.1     Recent Labs     19  0459   SODIUM 139   POTASSIUM 3.6   CHLORIDE 106   CO2 25   GLUCOSE 130*   BUN 21   CREATININE 0.78   CALCIUM 8.2*               Intake/Output       19 0700 - 19 0659 19 0700 - 19 0659      6996-1219 2087-8452 Total 2157-3147 1651-3853 Total       Intake    P.O.  120  -- 120  --  -- --    P.O. 120 -- 120 -- -- --    I.V.  700  1220 1920  --  -- --    Volume (mL) (lactated ringers infusion) 700 -- 700 -- -- --    Volume (mL) (NS infusion) -- 1220 1220 -- -- --    IV Piggyback  --  550 550  --  -- --    Volume (mL) (levETIRAcetam (KEPPRA) 500 mg in  mL IVPB) -- 300 300 -- -- --    Volume (mL) (vancomycin (VANCOCIN) 1,100 mg in  mL IVPB) -- 250 250 -- -- --    Total Intake 820 1770 2590 -- -- --       Output    Urine  650  1000 1650  --  -- --    Number of Times Voided 3 x 6 x 9 x -- -- --    Urine Void (mL) 650 1000 1650 -- -- --    Stool  --  -- --  --  -- --    Number of Times Stooled 0 x -- 0 x -- -- --    Blood  10  -- 10  --  -- --    Est. Blood Loss 10 -- 10 -- -- --    Total Output 660 1000 1660 -- -- --       Net I/O     160 770 930 -- -- --            Intake/Output Summary (Last 24 hours) at 2019 0931  Last data filed at 2019 0600  Gross per 24 hour   Intake 2590 ml   Output 1660 ml   Net 930 ml       $ Bladder  Scan Results (mL): 61    • MD ALERT...DO NOT ADMINISTER NSAIDS or ASPIRIN unless ORDERED By Neurosurgery  1 Each PRN   • dexamethasone  4 mg Once PRN   • scopolamine  1 Patch Q72HRS PRN   • cloNIDine  0.1 mg Q4HRS PRN   • docusate sodium  100 mg BID   • senna-docusate  1 Tab Nightly   • senna-docusate  1 Tab Q24HRS PRN   • polyethylene glycol/lytes  1 Packet BID PRN   • bisacodyl  10 mg Q24HRS PRN   • fleet  1 Each Once PRN   • artificial tears  1 Application Q8HRS   • NS   Continuous   • acetaminophen  1,000 mg Q6HRS   • MD Alert...Vancomycin per Pharmacy   PHARMACY TO DOSE   • dextrose 10% bolus  250 mL Q15 MIN PRN   • lisinopril  10 mg Q DAY   • levETIRAcetam (KEPPRA) IV  500 mg Q12HRS   • omeprazole  20 mg DAILY   • atorvastatin  40 mg Q EVENING   • dexamethasone  4 mg Q6HRS   • NS   Continuous   • acetaminophen  650 mg Q6HRS PRN   • docusate sodium  100 mg BID PRN   • enalaprilat  1.25 mg Q6HRS PRN   • hydrALAZINE  10 mg Q4HRS PRN   • lactulose  30 g Q24HRS PRN   • naloxone  0.1 mg Q5 MIN PRN   • ondansetron  4 mg Q4HRS PRN   • ondansetron  4 mg Q4HRS PRN   • promethazine  12.5 mg Q4HRS PRN   • Respiratory Care per Protocol   Continuous RT   • temazepam  7.5 mg QHS PRN       Assessment and Plan:  Hospital day # confusion r/t brain lesion  POD # 1 biopsy brain lesion: probable primary brain tumor, CT CPA negative,   Prophylactic anticoagulation: no         Start date/time: tbd    Await pathology result, recommend oncology, radiooncology consult.  Ok to floor, q 4 hour neurochecks  D/w Dr. Newton

## 2019-12-27 NOTE — PROGRESS NOTES
Pt arrived from PACU with RN on monitor.  A/Ox2, per PACU report pt confuse coming out of the OR.  Temp: 96.8F  Weight: 68.4kg

## 2019-12-27 NOTE — ASSESSMENT & PLAN NOTE
Imaging confirms a 17 x 14 x 19 mm mass within the collateral trigone of the left lateral ventricle appearing to involve the choroid plexus  S/P stereotactic brain biopsy earlier today - initial pathology reveals glial tumor  CT of the chest/abdomen/pelvis did not reveal evidence of malignancy  Continue Decadron, 4 mg IV every 6 hours  Continue Keppra, 500 mg twice daily  Neuro checks every hour

## 2019-12-27 NOTE — CONSULTS
PULMONARY AND CRITICAL CARE MEDICINE CONSULTATION    Date of Consultation:  12/27/2019    Requesting Physician:  Mario Newton DO    Consulting Physician:  Forrest Radford MD    Reason for Consultation: Critical care management in lady who has undergone brain biopsy    Chief Complaint: Brain biopsy    History of Present Illness:    I was kindly asked to see and evaluate Vera Garza, a 85 y.o. female for evaluation and management of the above problem.    This lady has a history of primary hypertension.  On or about 12/20/2019 she was admitted to [facility] with altered mental status.  Imaging confirmed a 17 x 14 x 19 mass within the collateral trigone of the left lateral ventricle which appeared to involve the choroid plexus.  CT imaging of the chest/abdomen/pelvis did not reveal evidence of malignancy.  She was started on Keppra and Decadron and had improvement in her mental status and speech.  Today she was taken to the operating theater by Dr. Newton and underwent stereotactic biopsy of her brain mass.  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  At this time she denies headache, nausea or vomiting.  She has no visual changes.    Medications Prior to Admission:    No current facility-administered medications on file prior to encounter.      Current Outpatient Medications on File Prior to Encounter   Medication Sig Dispense Refill   • omeprazole (PRILOSEC) 20 MG CPDR Take 20 mg by mouth every day.     • Nebivolol HCl (BYSTOLIC) 5 MG TABS Take 5 mg by mouth.         Current Medications:      Current Facility-Administered Medications:   •  MD ALERT...DO NOT ADMINISTER NSAIDS or ASPIRIN unless ORDERED By Neurosurgery 1 Each, 1 Each, Other, PRN, Mario Newton D.O.  •  dexamethasone (DECADRON) injection 4 mg, 4 mg, Intravenous, Once PRN, Mario Newton D.O.  •  scopolamine (TRANSDERM-SCOP) patch 1 Patch, 1 Patch, Transdermal, Q72HRS PRN, REECE Juarez.O.  •  cloNIDine (CATAPRES) tablet 0.1  mg, 0.1 mg, Oral, Q4HRS PRN, Mario Robinsond, D.O.  •  niCARdipine (CARDENE) 25 mg in  mL Infusion, 0-15 mg/hr, Intravenous, Continuous, Mario Robinsond, D.O., Stopped at 12/26/19 1719  •  docusate sodium (COLACE) capsule 100 mg, 100 mg, Oral, BID, Mariochristo Bowersd, D.O., 100 mg at 12/26/19 1748  •  senna-docusate (PERICOLACE or SENOKOT S) 8.6-50 MG per tablet 1 Tab, 1 Tab, Oral, Nightly, Mario Bowersd, D.O., Stopped at 12/26/19 2100  •  senna-docusate (PERICOLACE or SENOKOT S) 8.6-50 MG per tablet 1 Tab, 1 Tab, Oral, Q24HRS PRN, Mario Robinsond, D.O.  •  polyethylene glycol/lytes (MIRALAX) PACKET 1 Packet, 1 Packet, Oral, BID PRN, Mario Robinsond, D.O.  •  bisacodyl (DULCOLAX) suppository 10 mg, 10 mg, Rectal, Q24HRS PRN, Mario Robinsond, D.O.  •  fleet enema 133 mL, 1 Each, Rectal, Once PRN, Mario Robinsond, D.O.  •  artificial tears (EYE LUBRICANT) ophth ointment 1 Application, 1 Application, Both Eyes, Q8HRS, Mario Bowersd, D.O., Stopped at 12/26/19 2200  •  NS infusion, , Intravenous, Continuous, Mario Bowersd, D.O., Last Rate: 100 mL/hr at 12/26/19 1748  •  acetaminophen (TYLENOL) tablet 1,000 mg, 1,000 mg, Oral, Q6HRS, Mario Bowersd, D.O., Stopped at 12/27/19 0000  •  MD Alert...Vancomycin per Pharmacy, , Other, PHARMACY TO DOSE, Mario Bowersd, D.O.  •  DEXTROSE 10% BOLUS 250 mL, 250 mL, Intravenous, Q15 MIN PRN, Fracisco Ferrer M.D.  •  lisinopril (PRINIVIL) 10 MG tablet 10 mg, 10 mg, Oral, Q DAY, Fan De La Torre D.O., 10 mg at 12/26/19 0554  •  levETIRAcetam (KEPPRA) 500 mg in  mL IVPB, 500 mg, Intravenous, Q12HRS, Mario Newotn D.O., Stopped at 12/26/19 1802  •  omeprazole (PRILOSEC) capsule 20 mg, 20 mg, Oral, DAILY, Fracisco Ferrer M.D., 20 mg at 12/26/19 0554  •  atorvastatin (LIPITOR) tablet 40 mg, 40 mg, Oral, Q EVENING, Fracisco Ferrer M.D., 40 mg at 12/26/19 1748  •  dexamethasone (DECADRON) injection 4 mg, 4 mg, Intravenous, Q6HRS, Fracisco Ferrer M.D., 4 mg at 12/26/19 2012  •  NS infusion, , Intravenous,  Continuous, Fan De La Torre D.O., Last Rate: 50 mL/hr at 12/25/19 2003  •  acetaminophen (TYLENOL) tablet 650 mg, 650 mg, Oral, Q6HRS PRN, Fracisco Ferrer M.D.  •  docusate sodium (COLACE) capsule 100 mg, 100 mg, Oral, BID PRN, Fracisco Ferrer M.D.  •  enalaprilat (VASOTEC) injection 1.25 mg, 1.25 mg, Intravenous, Q6HRS PRN, Fracisco Ferrer M.D., 1.25 mg at 12/27/19 0038  •  hydrALAZINE (APRESOLINE) injection 10 mg, 10 mg, Intravenous, Q4HRS PRN, Fracisco Ferrer M.D.  •  lactulose 20 GM/30ML solution 30 g, 30 g, Oral, Q24HRS PRN, Fracisco Ferrer M.D.  •  naloxone (NARCAN) injection 0.1 mg, 0.1 mg, Intravenous, Q5 MIN PRN, Fracisco Ferrer M.D.  •  ondansetron (ZOFRAN ODT) dispertab 4 mg, 4 mg, Oral, Q4HRS PRN, Fracisco Ferrer M.D.  •  ondansetron (ZOFRAN) syringe/vial injection 4 mg, 4 mg, Intravenous, Q4HRS PRN, Fracisco Ferrer M.D., 4 mg at 12/26/19 1341  •  promethazine (PHENERGAN) injection 12.5 mg, 12.5 mg, Intramuscular, Q4HRS PRN, Fracisco Ferrer M.D.  •  Respiratory Care per Protocol, , Nebulization, Continuous RT, Fracisco Ferrer M.D.  •  temazepam (RESTORIL) 7.5 mg, 7.5 mg, Oral, QHS PRN, Fracisco Ferrer M.D.    Allergies:    Pcn [penicillins]    Past Surgical History:    Past Surgical History:   Procedure Laterality Date   • CRANIOTOMY STEALTH Left 12/26/2019    Procedure: CRANIOTOMY, USING FRAMELESS STEREOTAXY - FOR TEMPORAL NAVIGUS BIOPSY,;  Surgeon: Mario Newton D.O.;  Location: SURGERY [facility];  Service: Neurosurgery       Past Medical History:    Past Medical History:   Diagnosis Date   • Colitis    • Hypertension        Social History:    Social History     Socioeconomic History   • Marital status:      Spouse name: Not on file   • Number of children: Not on file   • Years of education: Not on file   • Highest education level: Not on file   Occupational History   • Not on file   Social Needs   • Financial resource strain: Not on file   • Food insecurity:     Worry: Not on file     Inability: Not on file   • Transportation  needs:     Medical: Not on file     Non-medical: Not on file   Tobacco Use   • Smoking status: Never Smoker   • Smokeless tobacco: Never Used   Substance and Sexual Activity   • Alcohol use: No   • Drug use: No   • Sexual activity: Not on file   Lifestyle   • Physical activity:     Days per week: Not on file     Minutes per session: Not on file   • Stress: Not on file   Relationships   • Social connections:     Talks on phone: Not on file     Gets together: Not on file     Attends Anab[employer]ist service: Not on file     Active member of club or organization: Not on file     Attends meetings of clubs or organizations: Not on file     Relationship status: Not on file   • Intimate partner violence:     Fear of current or ex partner: Not on file     Emotionally abused: Not on file     Physically abused: Not on file     Forced sexual activity: Not on file   Other Topics Concern   • Not on file   Social History Narrative   • Not on file       Family History:    History reviewed. No pertinent family history.    Review of System:    Review of Systems   Constitutional: Negative for chills, diaphoresis and fever.   HENT: Negative for ear discharge and nosebleeds.    Eyes: Negative for pain, discharge and redness.   Respiratory: Negative for cough, hemoptysis, shortness of breath and stridor.    Cardiovascular: Negative for chest pain, palpitations and leg swelling.   Gastrointestinal: Negative for abdominal pain, nausea and vomiting.   Genitourinary: Negative for dysuria, hematuria and urgency.   Musculoskeletal: Negative for myalgias and neck pain.   Skin: Negative for rash.   Neurological: Negative for seizures, loss of consciousness and headaches.   Endo/Heme/Allergies: Does not bruise/bleed easily.   Psychiatric/Behavioral: Negative for hallucinations, substance abuse and suicidal ideas. The patient is not nervous/anxious.        Physical Examination:    /70   Pulse 60   Temp 36.6 °C (97.8 °F) (Temporal)   Resp (!) 25  "  Ht 1.575 m (5' 2.01\")   Wt 68.4 kg (150 lb 12.7 oz)   SpO2 96%   BMI 27.57 kg/m²   Physical Exam   Constitutional: She is oriented to person, place, and time.   HENT:   Right Ear: External ear normal.   Left Ear: External ear normal.   Nose: Nose normal.   Mouth/Throat: Oropharynx is clear and moist.   Eyes: Pupils are equal, round, and reactive to light. Conjunctivae are normal.   Neck: Normal range of motion. Neck supple. No tracheal deviation present.   Cardiovascular: Intact distal pulses. Exam reveals no gallop.   No murmur heard.  Sinus rhythm   Pulmonary/Chest: No stridor. She has no wheezes. She has no rales.   Abdominal: Soft. Bowel sounds are normal. She exhibits no distension. There is no tenderness. There is no rebound.   Musculoskeletal:         General: No tenderness or edema.      Comments: No clubbing or cyanosis   Neurological: She is alert and oriented to person, place, and time. No cranial nerve deficit. GCS score is 15.   No focal weakness   Skin: Skin is warm and dry. No rash noted. She is not diaphoretic. No erythema.       Laboratory Data:                              Imaging:    I personally viewed the CXR and CT scan images as well as reviewed the radiology interpretation reports.    CT-HEAD W/O   Final Result         1. Small amount of blood and gas in the left parietal region, in keeping with postbiopsy change.      2. 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      3. No midline shift.      DX-CHEST-2 VIEWS   Final Result      No pulmonary consolidation.      MR-STEALTH BRAIN WITH & W/O   Final Result      1.  17 mm heterogeneously enhancing mass at the left trigone/choroid plexus concordant with findings on outside films MRI.   2.  Moderate cerebral atrophy. Age-appropriate.      CT-CHEST,ABDOMEN,PELVIS WITH   Final Result         1. No CT evidence of malignancy or metastatic disease in the chest, abdomen or pelvis.      2. Cholelithiasis.      CT-HEAD W/O    (Results " Pending)       Assessment and Plan:    * Brain mass- (present on admission)  Assessment & Plan  Imaging confirms a 17 x 14 x 19 mm mass within the collateral trigone of the left lateral ventricle appearing to involve the choroid plexus  S/P stereotactic brain biopsy earlier today - initial pathology reveals glial tumor  CT of the chest/abdomen/pelvis did not reveal evidence of malignancy  Continue Decadron, 4 mg IV every 6 hours  Continue Keppra, 500 mg twice daily  Neuro checks every hour    Essential hypertension- (present on admission)  Assessment & Plan  Continue lisinopril, 10 mg daily  Goal SBP less than 160    I have assessed and reassessed her neurologic status, blood pressure, hemodynamics and cardiovascular status.  This lady is critically ill in the ICU and requires very close neurological monitoring following brain biopsy for her left-sided brain mass.    High risk of deterioration and worsening vital organ dysfunction and death without the above critical care interventions.    Thank you for allowing me to participate in the care of this lady.  I will continue to follow her with great interest.    Critical Care Time: 32 minutes  02577  No time overlap  Time excludes procedures  Discussed with RN, RT    Forrest Radford MD  Pulmonary and Critical Care Medicine

## 2019-12-27 NOTE — PROGRESS NOTES
2RN skin check     Blanching redness on buttocks   Blanching heels   Surgical incision on left head: approximated, no drainage

## 2019-12-27 NOTE — THERAPY
Physical Therapy Contact Note    PT consult received, session attempted, pt had just mobilized with RN staff and is dizzy; will reattempt when able;     Laura Alex, PT, DPT Pager: [phone]

## 2019-12-27 NOTE — CARE PLAN
Problem: Safety  Goal: Will remain free from injury  Note:   Room near nursing station, bedalarm activated and pt educated on fall prevention measures.     Problem: Knowledge Deficit  Goal: Knowledge of disease process/condition, treatment plan, diagnostic tests, and medications will improve  Note:   Discussed plan of care for today and reasons for frequent neuro checks.  Pt verbalized understanding and participates in her care.

## 2019-12-28 VITALS
TEMPERATURE: 98.2 F | BODY MASS INDEX: 27.63 KG/M2 | OXYGEN SATURATION: 96 % | HEART RATE: 59 BPM | DIASTOLIC BLOOD PRESSURE: 58 MMHG | SYSTOLIC BLOOD PRESSURE: 114 MMHG | HEIGHT: 62 IN | WEIGHT: 150.13 LBS | RESPIRATION RATE: 16 BRPM

## 2019-12-28 LAB
ANION GAP SERPL CALC-SCNC: 8 MMOL/L (ref 0–11.9)
BUN SERPL-MCNC: 18 MG/DL (ref 8–22)
CALCIUM SERPL-MCNC: 7.6 MG/DL (ref 8.5–10.5)
CHLORIDE SERPL-SCNC: 110 MMOL/L (ref 96–112)
CO2 SERPL-SCNC: 21 MMOL/L (ref 20–33)
CREAT SERPL-MCNC: 0.67 MG/DL (ref 0.5–1.4)
ERYTHROCYTE [DISTWIDTH] IN BLOOD BY AUTOMATED COUNT: 40.4 FL (ref 35.9–50)
GLUCOSE SERPL-MCNC: 144 MG/DL (ref 65–99)
HCT VFR BLD AUTO: 36.3 % (ref 37–47)
HGB BLD-MCNC: 12.6 G/DL (ref 12–16)
MCH RBC QN AUTO: 32.4 PG (ref 27–33)
MCHC RBC AUTO-ENTMCNC: 34.7 G/DL (ref 33.6–35)
MCV RBC AUTO: 93.3 FL (ref 81.4–97.8)
PLATELET # BLD AUTO: 187 K/UL (ref 164–446)
PMV BLD AUTO: 9.1 FL (ref 9–12.9)
POTASSIUM SERPL-SCNC: 3.2 MMOL/L (ref 3.6–5.5)
RBC # BLD AUTO: 3.89 M/UL (ref 4.2–5.4)
SODIUM SERPL-SCNC: 139 MMOL/L (ref 135–145)
WBC # BLD AUTO: 8 K/UL (ref 4.8–10.8)

## 2019-12-28 PROCEDURE — 700111 HCHG RX REV CODE 636 W/ 250 OVERRIDE (IP): Performed by: INTERNAL MEDICINE

## 2019-12-28 PROCEDURE — 700102 HCHG RX REV CODE 250 W/ 637 OVERRIDE(OP): Performed by: STUDENT IN AN ORGANIZED HEALTH CARE EDUCATION/TRAINING PROGRAM

## 2019-12-28 PROCEDURE — 36415 COLL VENOUS BLD VENIPUNCTURE: CPT

## 2019-12-28 PROCEDURE — A9270 NON-COVERED ITEM OR SERVICE: HCPCS | Performed by: STUDENT IN AN ORGANIZED HEALTH CARE EDUCATION/TRAINING PROGRAM

## 2019-12-28 PROCEDURE — A9270 NON-COVERED ITEM OR SERVICE: HCPCS | Performed by: NEUROLOGICAL SURGERY

## 2019-12-28 PROCEDURE — 99239 HOSP IP/OBS DSCHRG MGMT >30: CPT | Performed by: STUDENT IN AN ORGANIZED HEALTH CARE EDUCATION/TRAINING PROGRAM

## 2019-12-28 PROCEDURE — 700102 HCHG RX REV CODE 250 W/ 637 OVERRIDE(OP): Performed by: NEUROLOGICAL SURGERY

## 2019-12-28 PROCEDURE — A9270 NON-COVERED ITEM OR SERVICE: HCPCS | Performed by: INTERNAL MEDICINE

## 2019-12-28 PROCEDURE — 700102 HCHG RX REV CODE 250 W/ 637 OVERRIDE(OP): Performed by: INTERNAL MEDICINE

## 2019-12-28 PROCEDURE — 700105 HCHG RX REV CODE 258: Performed by: NEUROLOGICAL SURGERY

## 2019-12-28 PROCEDURE — 700111 HCHG RX REV CODE 636 W/ 250 OVERRIDE (IP): Performed by: NEUROLOGICAL SURGERY

## 2019-12-28 PROCEDURE — 80048 BASIC METABOLIC PNL TOTAL CA: CPT

## 2019-12-28 PROCEDURE — 85027 COMPLETE CBC AUTOMATED: CPT

## 2019-12-28 RX ORDER — LISINOPRIL 10 MG/1
10 TABLET ORAL DAILY
Qty: 30 TAB | Refills: 0 | Status: SHIPPED | OUTPATIENT
Start: 2019-12-29

## 2019-12-28 RX ORDER — DEXAMETHASONE 4 MG/1
4 TABLET ORAL EVERY 6 HOURS
Qty: 84 TAB | Refills: 0 | Status: SHIPPED | OUTPATIENT
Start: 2019-12-28 | End: 2020-01-18

## 2019-12-28 RX ORDER — LEVETIRACETAM 500 MG/1
500 TABLET ORAL 2 TIMES DAILY
Qty: 60 TAB | Refills: 0 | Status: SHIPPED | OUTPATIENT
Start: 2019-12-28

## 2019-12-28 RX ADMIN — MINERAL OIL AND PETROLATUM 1 APPLICATION: 150; 830 OINTMENT OPHTHALMIC at 04:41

## 2019-12-28 RX ADMIN — LISINOPRIL 10 MG: 20 TABLET ORAL at 04:34

## 2019-12-28 RX ADMIN — SODIUM CHLORIDE 500 MG: 9 INJECTION, SOLUTION INTRAVENOUS at 04:37

## 2019-12-28 RX ADMIN — DEXAMETHASONE SODIUM PHOSPHATE 4 MG: 4 INJECTION, SOLUTION INTRA-ARTICULAR; INTRALESIONAL; INTRAMUSCULAR; INTRAVENOUS; SOFT TISSUE at 08:42

## 2019-12-28 RX ADMIN — MINERAL OIL AND PETROLATUM 1 APPLICATION: 150; 830 OINTMENT OPHTHALMIC at 00:11

## 2019-12-28 RX ADMIN — DEXAMETHASONE SODIUM PHOSPHATE 4 MG: 4 INJECTION, SOLUTION INTRA-ARTICULAR; INTRALESIONAL; INTRAMUSCULAR; INTRAVENOUS; SOFT TISSUE at 01:56

## 2019-12-28 RX ADMIN — ACETAMINOPHEN 1000 MG: 500 TABLET ORAL at 04:34

## 2019-12-28 RX ADMIN — ACETAMINOPHEN 1000 MG: 500 TABLET ORAL at 00:08

## 2019-12-28 RX ADMIN — OMEPRAZOLE 20 MG: 20 CAPSULE, DELAYED RELEASE ORAL at 04:34

## 2019-12-28 RX ADMIN — SODIUM CHLORIDE: 9 INJECTION, SOLUTION INTRAVENOUS at 01:57

## 2019-12-28 ASSESSMENT — ENCOUNTER SYMPTOMS
COUGH: 0
FEVER: 0
NERVOUS/ANXIOUS: 0
DEPRESSION: 0
PALPITATIONS: 0
SHORTNESS OF BREATH: 0
MEMORY LOSS: 1

## 2019-12-28 NOTE — PROGRESS NOTES
Pt and daughter educated about discharge instructions, prescriptions sent to pharmacy, pt discharged to home in a stable condition at this time

## 2019-12-28 NOTE — PROGRESS NOTES
Neurosurgery Progress Note    Subjective:  Pt denies headache    Exam:  Pt oriented to person, place and time, No motor deficit, CN grossly intact    BP  Min: 99/42  Max: 168/65  Pulse  Av.4  Min: 50  Max: 74  Resp  Av.2  Min: 16  Max: 17  Temp  Av.6 °C (97.8 °F)  Min: 36.1 °C (97 °F)  Max: 36.9 °C (98.4 °F)  SpO2  Av.7 %  Min: 95 %  Max: 96 %    No data recorded    Recent Labs     19  0610 19  0426   WBC 12.4* 8.0   RBC 4.27 3.89*   HEMOGLOBIN 14.0 12.6   HEMATOCRIT 40.3 36.3*   MCV 94.4 93.3   MCH 32.8 32.4   MCHC 34.7 34.7   RDW 41.2 40.4   PLATELETCT 233 187   MPV 9.1 9.1     Recent Labs     19  0459 19  0426   SODIUM 139 139   POTASSIUM 3.6 3.2*   CHLORIDE 106 110   CO2 25 21   GLUCOSE 130* 144*   BUN 21 18   CREATININE 0.78 0.67   CALCIUM 8.2* 7.6*               Intake/Output       19 0700 - 19 0659 19 07 - 19 0659       Total  Total       Intake    P.O.  100  -- 100  --  -- --    P.O. 100 -- 100 -- -- --    I.V.  400  -- 400  --  -- --    Volume (mL) (NS infusion) 400 -- 400 -- -- --    Total Intake 500 -- 500 -- -- --       Output    Urine  300  -- 300  --  -- --    Number of Times Voided 1 x 7 x 8 x 1 x -- 1 x    Urine Void (mL) 300 -- 300 -- -- --    Stool  --  -- --  --  -- --    Number of Times Stooled -- 3 x 3 x 2 x -- 2 x    Total Output 300 -- 300 -- -- --       Net I/O     200 -- 200 -- -- --            Intake/Output Summary (Last 24 hours) at 2019 0908  Last data filed at 2019 1000  Gross per 24 hour   Intake 200 ml   Output 300 ml   Net -100 ml            • MD ALERT...DO NOT ADMINISTER NSAIDS or ASPIRIN unless ORDERED By Neurosurgery  1 Each PRN   • dexamethasone  4 mg Once PRN   • scopolamine  1 Patch Q72HRS PRN   • cloNIDine  0.1 mg Q4HRS PRN   • docusate sodium  100 mg BID   • senna-docusate  1 Tab Nightly   • senna-docusate  1 Tab Q24HRS PRN   • polyethylene glycol/lytes  1  Packet BID PRN   • bisacodyl  10 mg Q24HRS PRN   • fleet  1 Each Once PRN   • artificial tears  1 Application Q8HRS   • NS   Continuous   • acetaminophen  1,000 mg Q6HRS   • dextrose 10% bolus  250 mL Q15 MIN PRN   • lisinopril  10 mg Q DAY   • levETIRAcetam (KEPPRA) IV  500 mg Q12HRS   • omeprazole  20 mg DAILY   • atorvastatin  40 mg Q EVENING   • dexamethasone  4 mg Q6HRS   • NS   Continuous   • acetaminophen  650 mg Q6HRS PRN   • docusate sodium  100 mg BID PRN   • enalaprilat  1.25 mg Q6HRS PRN   • hydrALAZINE  10 mg Q4HRS PRN   • lactulose  30 g Q24HRS PRN   • naloxone  0.1 mg Q5 MIN PRN   • ondansetron  4 mg Q4HRS PRN   • ondansetron  4 mg Q4HRS PRN   • promethazine  12.5 mg Q4HRS PRN   • Respiratory Care per Protocol   Continuous RT   • temazepam  7.5 mg QHS PRN       Assessment and Plan:   Hospital day #7 confusion r/t brain lesion  POD # 2 biopsy brain lesion: 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  Pt will follow up in two weeks for review of biopsy results  D/w Dr. Newton

## 2019-12-28 NOTE — PROGRESS NOTES
Hospital Medicine Daily Progress Note    Date of Service: 12/27/2019   Hospital Day: 7 Pat. Class: Inpatient     Chief Complaint - Altered Mental Status Interval Problem Update  Patient was seen and evaluated today for Altered Mental Status  Disposition: Remain IP   Hospital Course     Direct Admission Day Course  85 y.o. female with past medical history of essential hypertension who presented 12/20/2019 with altered mental status.  She initially presented to outlying facility because surgery.  According to the patient she has been confused for the past 2 days.  The patient is accompanied by the daughter by the bedside.  The patient lives alone at home.  Currently according to the daughter she is still a little bit confused.  She stated that she has been having declining in her memory over the past few months.  The patient denies any headache, any weakness numbness over her extremities.  But she does mention that she noticed some slurred speech 2 days ago.  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 This is of high attenuation. Differential diagnostic possibilities need to include a choroidal plexus papilloma as well as intra-ventricular meningioma. MRI 17 x 14 x 19 mm is noted situated within the choroid plexus of the left lateral ventricle, within the collateral trigone. There is a mild amount of mass effect upon adjacent brain structures, but no evidence of current ventricular obstruction. NS was consulted before transfer    12/21 - Neurosurg eval today, feels mass from cord plexus. Likely biopsy pending May not be amenable to resection given pt age. Started Decadron and Keppra  12/22 - Less confusion today.  Continues on Decadron.  Repeat MRI per NS today, followed by biopsy this week. Pt will be medically cleared for surgical procedure today.  Will check EKG, update labs.  If  "those are unremarkable, pt is clear for surgery  12/23 - Biopsy rescheduled to Thursday.  Encephalopathy nearly completely resolved.  Continued steroids.  12/24 - Steroids continuing.  Daughter notes they had some concerns over the last few years about her mom's increasing dementia-like symptoms.   BP a bit elevated during hospitalization, not adjusting medication for now with patient improving clinically, even over yesterday.Will adjust medication after biopsy with NS guidance.  12/25 - BP remains persistently elevated today, starting 10mg lisinopril.  Small improvements in cognition, ambulating halls.  Biopsy tomorrow.  12/26 - leveled off improvement cognitively.  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  12/27 - Mentation improved, follow-up with NS office in 2 weeks for results, likely discahrge tomorrow.  Patient actively ambulating the halls.    Code Status: Full Code  Consultants/Specialty:  Neurosurgery Procedures During Hospitalization:  12/26 - Left temporal (lobe) stereotactic craniotomy with brain tumor biopsy.   Lines, Drains, Airways, Wounds  Lines:  Drains:  Airway:  Wounds: VTE prophylaxis: SCD ordered today        Diet Order Full Liquid    Assessment/Plan  * Brain mass- (present on admission)  Assessment & Plan  12/27 - Biopsy completed, patient will have follow-up in the OP in two weeks for results, likely discharge tomorrow.  Mentation actually improved quite a bit, much less visible perplexed look and more appropriate conversation.  12/26 - Patient seems to have flatlined in her improvement.  No changes at all now.  Biopsy today.  12/25 - patient ambulating halls, daughter communicates improvement in cognition.  Biopsy scheduled for tomorrow @1300  12/24 - Continued improvement today.  Awaiting biopsy, continue " steroids.  12/23 - Biopsy has been rescheduled to Thursday.  Patient and patient's family note remarkable improvement since she was admitted.  Continue Steroids.  - Previous Plans by Other Providers -  CT head showed;  1.5 cm mass lesion associated with the left choroidal plexus in the atria of the left lateral ventricle. This is of high attenuation. Differential diagnostic possibilities need to include a choroidal plexus papilloma as well as intra-ventricular meningioma.   MRI brain 17 x 14 x 19 mm is noted situated within the choroid plexus of the left lateral ventricle, within the collateral trigone. There is a mild amount of mass effect upon adjacent brain structures, but no evidence of   current ventricular obstruction.  Dr. Newton consulted  CT chest/abd/pelvic ordered by NS  Monitor for possible seizure or new neurologic deficits  Started on decadron IV  NPO at midnight    Essential hypertension- (present on admission)  Assessment & Plan  12/26-12/27 - stable  12/25 - continues to be poorly controlled, patient states not on any blood pressure medication prior to hospitalization.  Starting lisinopril 10mg today.  12/24 - poorly controlled in the mid 140s up to 160s while inpatient.  Permissive hypertension until after biopsy.   12/23 - stable  - Previous Plans by Other Providers -  Holding bysystolic    Hyperglycemia- (present on admission)  Assessment & Plan  12/23-12/27 - stable  - Previous Plans by Other Providers -  No history of DM        Acute encephalopathy- (present on admission)  Assessment & Plan  12/26-12/27 - stable  12/23-12/25 - Much improved.  - Previous Plans by Other Providers -  Likely related to brain mass  Avoid narcotics         Laboratory  Results from last 7 days   Lab Units 12/27/19  0610 12/23/19  0258   WBC 1501 K/uL 12.4* 8.9   HGB 1503 g/dL 14.0 12.8   HCT 1504 % 40.3 36.7*   PLATELET COUNT 1518 K/uL 233 245    Results from last 7 days   Lab Units 12/27/19  0459 12/23/19  0258  12/22/19  1233   SODIUM 101 mmol/L 139  --  140   POTASSIUM 102 mmol/L 3.6  --  3.7   CHLORIDE 103 mmol/L 106  --  108   CO2 104 mmol/L 25  --  24   ANION GAP ANION  8.0  --  8.0    mg/dL 21  --  16   CREATININE 109 mg/dL 0.78  --  0.79   CALCIUM 105 mg/dL 8.2*  --  8.5   GLUCOSE 112 mg/dL 130*  --  125*   IF [employer] [id number] mL/min/1.73 m 2 >60  --  >60   IF NON [employer] [id number] mL/min/1.73 m 2 >60  --  >60   MAGNESIUM 561 mg/dL  --  2.0  --              Intake/Output Summary (Last 24 hours) at 12/27/2019 1943  Last data filed at 12/27/2019 1000  Gross per 24 hour   Intake 2270 ml   Output 1300 ml   Net 970 ml    Vital Signs  Temp:  [36.2 °C (97.2 °F)-37 °C (98.6 °F)] 36.6 °C (97.8 °F)  Pulse:  [49-71] 59  Resp:  [10-93] 16  BP: ()/(42-79) 111/53  SpO2:  [92 %-99 %] 96 %     Review of Systems  Review of Systems   Constitutional: Negative for fever and malaise/fatigue.   Skin: Negative for itching and rash.   Psychiatric/Behavioral: Negative for depression. The patient is not nervous/anxious.     Physical Exam  Physical Exam   Constitutional: No distress.   Neurological: She is alert.   Alert and oriented, appriately conversant, aware of surroundings. Not necessarily AOx4 because of confusion of timeline/day of the week, etc.  Expected actually   Skin: Skin is warm and dry. She is not diaphoretic.   Nursing note and vitals reviewed.       Medications  docusate sodium, 100 mg, Oral, BID  senna-docusate, 1 Tab, Oral, Nightly  artificial tears, 1 Application, Both Eyes, Q8HRS  acetaminophen, 1,000 mg, Oral, Q6HRS  lisinopril, 10 mg, Oral, Q DAY  levETIRAcetam (KEPPRA) IV, 500 mg, Intravenous, Q12HRS  omeprazole, 20 mg, Oral, DAILY  atorvastatin, 40 mg, Oral, Q EVENING  dexamethasone, 4 mg, Intravenous, Q6HRS         Medications were reviewed today.     Imaging  CT-HEAD W/O   Final Result      Stable noncontrast CT with no evidence of new intracranial hemorrhage.      Diminishing pneumocephalus       Left temporal craniotomy with underlying hemorrhage in the operative bed and along the left tentorium/falx      Nonspecific white matter changes and cerebral volume loss.      CT-HEAD W/O   Final Result         1. Small amount of blood and gas in the left parietal region, in keeping with postbiopsy change.      2. 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      3. No midline shift.      DX-CHEST-2 VIEWS   Final Result      No pulmonary consolidation.      MR-STEALTH BRAIN WITH & W/O   Final Result      1.  17 mm heterogeneously enhancing mass at the left trigone/choroid plexus concordant with findings on outside films MRI.   2.  Moderate cerebral atrophy. Age-appropriate.      CT-CHEST,ABDOMEN,PELVIS WITH   Final Result         1. No CT evidence of malignancy or metastatic disease in the chest, abdomen or pelvis.      2. Cholelithiasis.

## 2019-12-28 NOTE — DISCHARGE INSTRUCTIONS
Discharge Instructions    Discharged to home by car with relative. Discharged via wheelchair, hospital escort: Yes.  Special equipment needed: Not Applicable    Be sure to schedule a follow-up appointment with your primary care doctor or any specialists as instructed.     Discharge Plan:     Discharge Instructions per Fan De La Torre D.O.    I have no additional instructions for you.    DIET: Diet Order Full Liquid    ACTIVITY: as tolerated    DIAGNOSIS: Brain mass    Return to ER if original symptoms worsen or new similar symptoms appear    Diet Plan: Discussed  Activity Level: Discussed  Confirmed Follow up Appointment: Appointment Scheduled  Confirmed Symptoms Management: Discussed  Medication Reconciliation Updated: Yes  Influenza Vaccine Indication: Patient Refuses    I understand that a diet low in cholesterol, fat, and sodium is recommended for good health. Unless I have been given specific instructions below for another diet, I accept this instruction as my diet prescription.   Other diet: regular    Special Instructions: None    · Is patient discharged on Warfarin / Coumadin?   No     Depression / Suicide Risk    As you are discharged from this [facility] facility, it is important to learn how to keep safe from harming yourself.    Recognize the warning signs:  · Abrupt changes in personality, positive or negative- including increase in energy   · Giving away possessions  · Change in eating patterns- significant weight changes-  positive or negative  · Change in sleeping patterns- unable to sleep or sleeping all the time   · Unwillingness or inability to communicate  · Depression  · Unusual sadness, discouragement and loneliness  · Talk of wanting to die  · Neglect of personal appearance   · Rebelliousness- reckless behavior  · Withdrawal from people/activities they love  · Confusion- inability to concentrate     If you or a loved one observes any of these behaviors or has concerns about self-harm,  here's what you can do:  · Talk about it- your feelings and reasons for harming yourself  · Remove any means that you might use to hurt yourself (examples: pills, rope, extension cords, firearm)  · Get professional help from the community (Mental Health, Substance Abuse, psychological counseling)  · Do not be alone:Call your Safe Contact- someone whom you trust who will be there for you.  · Call your local CRISIS HOTLINE 460-1920 or 931-364-0034  · Call your local Children's Mobile Crisis Response Team [place] (467) 783-9196 or www.PrivacyProtector  · Call the toll free National Suicide Prevention Hotlines   · National Suicide Prevention Lifeline 507-925-NLFV (0225)  · National Hope Line Network 800-SUICIDE (658-4781)    Discharge Instructions per Fan De La Torre D.O.    I have no additional instructions for you.    DIET: Diet Order Full Liquid    ACTIVITY: as tolerated    DIAGNOSIS: Brain mass    Return to ER if original symptoms worsen or new similar symptoms appear

## 2019-12-28 NOTE — PROGRESS NOTES
Pt is A&Ox2, confused with time and situation, cooperative.  Family is at bedside. VSS.  Denies pain.  Incision w/dermond to head is CDI.  SBA w/ FWW up to BR/ when OOB.  Calls appropriately.  Denies N/T at this time.  Pt updated on POC, needs met and questions answered.  Call light within reach and working properly.

## 2019-12-28 NOTE — PROGRESS NOTES
RN MOBILITY NOTE     Surgery patient?: yes  Date of surgery: 12/6  Ambulated 50 ft on day of surgery? (N/A if today is not date of surgery): n/a  Number of times ambulated 50 feet or greater today: 4  Patient has been up to chair, edge of bed or HOB 90 degrees for all meals?: yes  Goal met? (goal is ambulating at least 50 feet 2 times on day shift, one time on night shift): yes  If patient did not meet mobility goal, why?:

## 2019-12-28 NOTE — CARE PLAN
Problem: Communication  Goal: The ability to communicate needs accurately and effectively will improve  Outcome: MET  Note:   Pt educated on use of call light and white board for communication, pt verbalizes understanding of white board communication and times of rounding.  Needs reinforcement.      Problem: Safety  Goal: Will remain free from injury  Outcome: PROGRESSING AS EXPECTED  Note:   Fall precaution in place, mobility assessed and signs placed outside pt door.  SBA.  Daughter at bedside.  Staying the night.  Reminded pt to call for assistance when she needs to get OOB/assitance.  Pt and daughter verbalize understanding.  Bed in the lowest locked position. Call light within reach.

## 2019-12-28 NOTE — DISCHARGE SUMMARY
Discharge Summary    CHIEF COMPLAINT ON ADMISSION  No chief complaint on file.      Reason for Admission  Brain tumor     Admission Date  12/20/2019    CODE STATUS  Full Code    HPI & HOSPITAL COURSE    Direct Admission Day Course  85 y.o. female with past medical history of essential hypertension who presented 12/20/2019 with altered mental status.  She initially presented to outlying facility because surgery.  According to the patient she has been confused for the past 2 days.  The patient is accompanied by the daughter by the bedside.  The patient lives alone at home.  Currently according to the daughter she is still a little bit confused.  She stated that she has been having declining in her memory over the past few months.  The patient denies any headache, any weakness numbness over her extremities.  But she does mention that she noticed some slurred speech 2 days ago.  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 This is of high attenuation. Differential diagnostic possibilities need to include a choroidal plexus papilloma as well as intra-ventricular meningioma. MRI 17 x 14 x 19 mm is noted situated within the choroid plexus of the left lateral ventricle, within the collateral trigone. There is a mild amount of mass effect upon adjacent brain structures, but no evidence of current ventricular obstruction. NS was consulted before transfer    12/21 - Neurosurg eval today, feels mass from cord plexus. Likely biopsy pending May not be amenable to resection given pt age. Started Decadron and Keppra  12/22 - Less confusion today.  Continues on Decadron.  Repeat MRI per NS today, followed by biopsy this week. Pt will be medically cleared for surgical procedure today.  Will check EKG, update labs.  If those are unremarkable, pt is clear for surgery  12/23 - Biopsy rescheduled to  "Thursday.  Encephalopathy nearly completely resolved.  Continued steroids.  12/24 - Steroids continuing.  Daughter notes they had some concerns over the last few years about her mom's increasing dementia-like symptoms.   BP a bit elevated during hospitalization, not adjusting medication for now with patient improving clinically, even over yesterday.Will adjust medication after biopsy with NS guidance.  12/25 - BP remains persistently elevated today, starting 10mg lisinopril.  Small improvements in cognition, ambulating halls.  Biopsy tomorrow.  12/26 - leveled off improvement cognitively.  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  12/27 - Mentation improved, follow-up with NS office in 2 weeks for results, likely discahrge tomorrow.  Patient actively ambulating the halls.  12/28 - Discharged on 500mg keppra bid, 4mg decadron q6h until appointment and 10mg lisinopril.  Patient has appt with NS for follow-up planning.    Code Status: Full Code  Consultants/Specialty:  Neurosurgery Procedures During Hospitalization:  12/26 - Left temporal (lobe) stereotactic craniotomy with brain tumor biopsy.   Lines, Drains, Airways, Wounds  Lines:  Drains:  Airway:  Wounds: VTE prophylaxis: SCD ordered today     Discharge Day Exam    Review of Systems  Review of Systems   Constitutional: Negative for fever and malaise/fatigue.   Respiratory: Negative for cough and shortness of breath.    Cardiovascular: Negative for chest pain and palpitations.   Skin: Negative for itching and rash.   Psychiatric/Behavioral: Positive for memory loss. Negative for depression. The patient is not nervous/anxious.     Physical Exam  Physical Exam   Constitutional: No distress.   Neurological: She is alert. No cranial nerve deficit. Coordination normal.   Skin: Skin is warm and dry. " She is not diaphoretic.   Psychiatric: She has a normal mood and affect. Her behavior is normal.   Some memory recall issues persist.   Nursing note and vitals reviewed.       Vital Signs  Temp:  [36.1 °C (97 °F)-36.9 °C (98.4 °F)] 36.5 °C (97.7 °F)  Pulse:  [50-74] 56  Resp:  [16-17] 16  BP: ()/(42-68) 162/68  SpO2:  [95 %-96 %] 96 %    Discharge Day Assessment/Plan  * Brain mass- (present on admission)  Assessment & Plan  12/28 - Patient medically stable for discharge.  Discharging on 4mg decadron q6h until cleared by NS and keppra 500mg bid  12/27 - Biopsy completed, patient will have follow-up in the OP in two weeks for results, likely discharge tomorrow.  Mentation actually improved quite a bit, much less visible perplexed look and more appropriate conversation.  12/26 - Patient seems to have flatlined in her improvement.  No changes at all now.  Biopsy today.  12/25 - patient ambulating halls, daughter communicates improvement in cognition.  Biopsy scheduled for tomorrow @1300  12/24 - Continued improvement today.  Awaiting biopsy, continue steroids.  12/23 - Biopsy has been rescheduled to Thursday.  Patient and patient's family note remarkable improvement since she was admitted.  Continue Steroids.  - Previous Plans by Other Providers -  CT head showed;  1.5 cm mass lesion associated with the left choroidal plexus in the atria of the left lateral ventricle. This is of high attenuation. Differential diagnostic possibilities need to include a choroidal plexus papilloma as well as intra-ventricular meningioma.   MRI brain 17 x 14 x 19 mm is noted situated within the choroid plexus of the left lateral ventricle, within the collateral trigone. There is a mild amount of mass effect upon adjacent brain structures, but no evidence of   current ventricular obstruction.  Dr. Newton consulted  CT chest/abd/pelvic ordered by NS  Monitor for possible seizure or new neurologic deficits  Started on decadron IV  NPO  at midnight    Essential hypertension- (present on admission)  Assessment & Plan  12/28 - discharged on 10mg lisinopril.  Intermittently well controlled on this and intermittently not controlled.  Large swings give concern for repeatability of the measure of her BP vs. Physiologic disturbances from the brain mass.  12/26-12/27 - stable  12/25 - continues to be poorly controlled, patient states not on any blood pressure medication prior to hospitalization.  Starting lisinopril 10mg today.  12/24 - poorly controlled in the mid 140s up to 160s while inpatient.  Permissive hypertension until after biopsy.   12/23 - stable  - Previous Plans by Other Providers -  Holding bysystolic    Hyperglycemia- (present on admission)  Assessment & Plan  12/23-12/27 - stable  - Previous Plans by Other Providers -  No history of DM        Acute encephalopathy- (present on admission)  Assessment & Plan  12/28 - Seems improved, likely near baseline.  Signficant family support for patient.  12/26-12/27 - stable  12/23-12/25 - Much improved.  - Previous Plans by Other Providers -  Likely related to brain mass  Avoid narcotics             Therefore, she is discharged in good and stable condition to home with close outpatient follow-up.    The patient met 2-midnight criteria for an inpatient stay at the time of discharge.    Discharge Date  12/28/19      FOLLOW UP ITEMS POST DISCHARGE  Will need radiation oncology follow-up and oncology follow-up for management of condition    DISCHARGE DIAGNOSES  Principal Problem:    Brain mass POA: Yes  Active Problems:    Essential hypertension POA: Yes    Acute encephalopathy POA: Yes    Hyperglycemia POA: Yes  Resolved Problems:    * No resolved hospital problems. *      FOLLOW UP  No future appointments.  Philip Hansen D.O.  [address]  [facility] [id number] [phone]    Schedule an appointment as soon as possible for a visit in 1 week  Post hospitalization follow-up, [place]  primary care, Evaluation of your blood pressure      MEDICATIONS ON DISCHARGE     Medication List      START taking these medications      Instructions   dexamethasone 4 MG Tabs  Commonly known as:  DECADRON   Take 1 Tab by mouth every 6 hours for 21 days. Or until cleared by neurosurgery  Dose:  4 mg     levETIRAcetam 500 MG Tabs  Commonly known as:  KEPPRA   Take 1 Tab by mouth 2 times a day.  Dose:  500 mg     lisinopril 10 MG Tabs  Start taking on:  December 29, 2019  Commonly known as:  PRINIVIL   Take 1 Tab by mouth every day.  Dose:  10 mg        STOP taking these medications    BYSTOLIC 5 MG Tabs tablet  Generic drug:  nebivolol     cephALEXin 500 MG Caps  Commonly known as:  KEFLEX     omeprazole 20 MG delayed-release capsule  Commonly known as:  PRILOSEC            Allergies  Allergies   Allergen Reactions   • Pcn [Penicillins]        DIET  Orders Placed This Encounter   Procedures   • Diet Order Full Liquid     Standing Status:   Standing     Number of Occurrences:   1     Order Specific Question:   Diet:     Answer:   Full Liquid [11]       ACTIVITY  As tolerated.  Weight bearing as tolerated      LABORATORY  Lab Results   Component Value Date    SODIUM 139 12/28/2019    POTASSIUM 3.2 (L) 12/28/2019    CHLORIDE 110 12/28/2019    CO2 21 12/28/2019    GLUCOSE 144 (H) 12/28/2019    BUN 18 12/28/2019    CREATININE 0.67 12/28/2019        Lab Results   Component Value Date    WBC 8.0 12/28/2019    HEMOGLOBIN 12.6 12/28/2019    HEMATOCRIT 36.3 (L) 12/28/2019    PLATELETCT 187 12/28/2019        Total time of the discharge process exceeds 40 minutes.

## 2019-12-28 NOTE — CARE PLAN
Problem: Safety  Goal: Will remain free from falls  Outcome: PROGRESSING AS EXPECTED  Intervention: Implement fall precautions  Flowsheets  Taken 12/28/2019 0830  Environmental Precautions: Treaded Slipper Socks on Patient;Personal Belongings, Wastebasket, Call Bell etc. in Easy Reach;Bed in Low Position  Taken 12/28/2019 0858  Chair/Bed Strip Alarm: Yes - Alarm On     Problem: Infection  Goal: Will remain free from infection  Outcome: PROGRESSING AS EXPECTED

## 2020-01-21 ENCOUNTER — PATIENT OUTREACH (OUTPATIENT)
Dept: OTHER | Facility: MEDICAL CENTER | Age: 85
End: 2020-01-21

## 2020-01-21 NOTE — PROGRESS NOTES
Cancer nurse navigation    Pathology review:  Pt with newly diagnosed Glioblastoma.  Call placed to [facility] and spoke with , Maddi.  Pt had hospital follow up 1/7/20 and referrals to Dr Hudson, radiation therapy and Dr Genao, medical oncology.  Pt referred outside [facility], and appropriate referrals made.  No further cancer nurse navigation required.

## (undated) DEVICE — KIT GUIDE EXTERNAL PASSIVE TRAJECTORY

## (undated) DEVICE — GLOVE BIOGEL SZ 8 SURGICAL PF LTX - (50PR/BX 4BX/CA)

## (undated) DEVICE — SET LEADWIRE 5 LEAD BEDSIDE DISPOSABLE ECG (1SET OF 5/EA)

## (undated) DEVICE — NEPTUNE 4 PORT MANIFOLD - (20/PK)

## (undated) DEVICE — SCALP CLIP RANEY 20-1037 (10EA/PK 20PK/CA)

## (undated) DEVICE — TUBE E-T HI-LO CUFF 7.0MM (10EA/PK)

## (undated) DEVICE — LACTATED RINGERS INJ. 500 ML - (24EA/CA)

## (undated) DEVICE — GLOVE BIOGEL INDICATOR SZ 8 SURGICAL PF LTX - (50/BX 4BX/CA)

## (undated) DEVICE — GLOVE BIOGEL SZ 7 SURGICAL PF LTX - (50PR/BX 4BX/CA)

## (undated) DEVICE — GLOVE BIOGEL PI INDICATOR SZ 7.0 SURGICAL PF LF - (50/BX 4BX/CA)

## (undated) DEVICE — LACTATED RINGERS INJ 1000 ML - (14EA/CA 60CA/PF)

## (undated) DEVICE — BLADE CLIPPER FITS 2501 CLIPPER (BLUE)  (20EA/CA)

## (undated) DEVICE — CHLORAPREP 26 ML APPLICATOR - ORANGE TINT(25/CA)

## (undated) DEVICE — COVER PROBE STERILE CONE (12EA/CA)

## (undated) DEVICE — PACK CRANI - (1EA/CA)

## (undated) DEVICE — MIDAS LUBRICATOR DIFFUSER PACK (4EA/CA)

## (undated) DEVICE — PIN HEAD MAYFIELD DISP. (3EA/PK 12PK/BX)

## (undated) DEVICE — SUCTION INSTRUMENT YANKAUER BULBOUS TIP W/O VENT (50EA/CA)

## (undated) DEVICE — ELECTRODE 850 FOAM ADHESIVE - HYDROGEL RADIOTRNSPRNT (50/PK)

## (undated) DEVICE — SODIUM CHL IRRIGATION 0.9% 1000ML (12EA/CA)

## (undated) DEVICE — PATTIES SURG X-RAYCOTTONOID - 1 X 3 IN (200/CA)

## (undated) DEVICE — SLEEVE, VASO, THIGH, MED

## (undated) DEVICE — CANISTER SUCTION 3000ML MECHANICAL FILTER AUTO SHUTOFF MEDI-VAC NONSTERILE LF DISP  (40EA/CA)

## (undated) DEVICE — HEMOSTAT SURG ABSORBABLE - 4 X 8 IN SURGICEL (24EA/CA)

## (undated) DEVICE — SPHERE NAVIGATION STEALTH (5EA/TY 12TY/PK)

## (undated) DEVICE — DISSECT TOOL MIDAS F2/8TA23

## (undated) DEVICE — Device

## (undated) DEVICE — GOWN WARMING STANDARD FLEX - (30/CA)

## (undated) DEVICE — DRESSING XEROFORM 1X8 - (50/BX 4BX/CA)

## (undated) DEVICE — KIT SURGIFLO W/OUT THROMBIN - (6EA/CA)

## (undated) DEVICE — SURGIFOAM (SIZE 100) - (6EA/CA)

## (undated) DEVICE — GLOVE BIOGEL INDICATOR SZ 7.5 SURGICAL PF LTX - (50PR/BX 4BX/CA)

## (undated) DEVICE — DISSECT TOOL MIDAS REX 14BA75

## (undated) DEVICE — TUBING CLEARLINK DUO-VENT - C-FLO (48EA/CA)

## (undated) DEVICE — KIT ANESTHESIA W/CIRCUIT & 3/LT BAG W/FILTER (20EA/CA)

## (undated) DEVICE — KIT ROOM DECONTAMINATION

## (undated) DEVICE — PERFORATER DISP TIP DGR-0

## (undated) DEVICE — BOVIE BLADE COATED &INSULATED - 25/PK

## (undated) DEVICE — PROTECTOR ULNA NERVE - (36PR/CA)

## (undated) DEVICE — STAPLER SKIN DISP - (6/BX 10BX/CA) VISISTAT

## (undated) DEVICE — BLANKET WARMING FULL BODY - (10/CA)

## (undated) DEVICE — SET EXTENSION WITH 2 PORTS (48EA/CA) ***PART #2C8610 IS A SUBSTITUTE*****

## (undated) DEVICE — TOWELS CLOTH SURGICAL - (4/PK 20PK/CA)

## (undated) DEVICE — DRESSING NON-ADHERING 8 X 3 - (50/BX)

## (undated) DEVICE — SPONGE GAUZESTER 4 X 4 4PLY - (128PK/CA)

## (undated) DEVICE — ELECTRODE DUAL RETURN W/ CORD - (50/PK)

## (undated) DEVICE — PATTIES SURG X-RAYCOTTONOID - 1/2 X 3 IN (200/CA)

## (undated) DEVICE — KIT BIOPSY NEEDLE

## (undated) DEVICE — DRAPE IOBAN II INCISE 23X17 - (10EA/BX 4BX/CA)

## (undated) DEVICE — MASK ANESTHESIA ADULT  - (100/CA)

## (undated) DEVICE — SUTURE 4-0 NUROLON CR/8 TF - (12/BX) ETHICON

## (undated) DEVICE — COVER, LITELOK CAMERA

## (undated) DEVICE — SENSOR SPO2 NEO LNCS ADHESIVE (20/BX) SEE USER NOTES

## (undated) DEVICE — TUBING C&T SET FLYING LEADS DRAIN TUBING (10EA/BX)

## (undated) DEVICE — SUTURE 4-0 VICYRL RAPIDE PS-2 UNDYED 18 (12EA/BX)"